# Patient Record
Sex: FEMALE | Race: WHITE | NOT HISPANIC OR LATINO | Employment: FULL TIME | URBAN - METROPOLITAN AREA
[De-identification: names, ages, dates, MRNs, and addresses within clinical notes are randomized per-mention and may not be internally consistent; named-entity substitution may affect disease eponyms.]

---

## 2017-09-22 ENCOUNTER — OFFICE VISIT (OUTPATIENT)
Dept: NEUROLOGY | Facility: CLINIC | Age: 22
End: 2017-09-22

## 2017-09-22 VITALS
HEIGHT: 65 IN | SYSTOLIC BLOOD PRESSURE: 106 MMHG | DIASTOLIC BLOOD PRESSURE: 75 MMHG | WEIGHT: 125 LBS | BODY MASS INDEX: 20.83 KG/M2

## 2017-09-22 DIAGNOSIS — G43.719 INTRACTABLE CHRONIC MIGRAINE WITHOUT AURA AND WITHOUT STATUS MIGRAINOSUS: ICD-10-CM

## 2017-09-22 DIAGNOSIS — R51.9 CHRONIC DAILY HEADACHE: Primary | ICD-10-CM

## 2017-09-22 PROCEDURE — 99204 OFFICE O/P NEW MOD 45 MIN: CPT | Performed by: PSYCHIATRY & NEUROLOGY

## 2017-09-22 RX ORDER — FLUOXETINE HYDROCHLORIDE 40 MG/1
40 CAPSULE ORAL DAILY
COMMUNITY
End: 2021-12-20

## 2017-09-22 NOTE — PROGRESS NOTES
"Subjective   Brittanie Bentley is a 21 y.o. female.     History of Present Illness     The following portions of the patient's history were reviewed today and updated as appropriate:  allergies, current medications, past family history, past medical history, past social history, past surgical history and problem list.      Chief complaint is headache  The time I spent with the patient today was used discussing the differential diagnosis, treatment and management options and prognosis. I addressed all of the patient's questions.   has childhood onset migraine type headaches and she has about one every couple weeks to 1 week. They are disabling is aggravated by activity with some nausea no vomiting and she is on occasion incapacitated. However she keeps the daily headache and several month ago started Topamax. The past she took it and lost a significant amount of weight and had to stop the medication but now she keeps her weight steady. She also has tingling paresthesias of side effects.    Review of Systems   Constitutional: Negative for appetite change, chills and fatigue.   HENT: Negative for congestion, ear pain, facial swelling and sinus pressure.    Eyes: Negative for pain and redness.   Respiratory: Negative for shortness of breath.    Cardiovascular: Negative for chest pain.   Gastrointestinal: Negative for abdominal pain.   Endocrine: Negative for cold intolerance and heat intolerance.   Genitourinary: Negative for dysuria.   Musculoskeletal: Negative for arthralgias.   Skin: Negative for rash.   Allergic/Immunologic: Negative for immunocompromised state.   Neurological: Positive for headaches.   Hematological: Negative for adenopathy.   Psychiatric/Behavioral: Negative for hallucinations.         Objective      height is 65\" (165.1 cm) and weight is 125 lb (56.7 kg). Her blood pressure is 106/75.     The patient's general appearance was normal today  Carotid pulses were palpable bilaterally  The " ophthalmological exam showed the refractory media clear, no blurring of disc margins, there were no hemorrhages noted, blood vessels appeared normal.      Neurologic Exam     Mental Status   Oriented to person.   Oriented to place. Oriented to country, city, area and street.   Oriented to time. Oriented to year, month, date, day and season.   Registration: recalls 3 of 3 objects. Recall at 5 minutes: recalls 3 of 3 objects. Follows 3 step commands.   Attention: normal.   Speech: speech is normal   Level of consciousness: alert  Knowledge: consistent with education.   Able to name object. Able to read. Able to repeat. Able to write. Normal comprehension.     Cranial Nerves     CN II   Visual fields full to confrontation.     CN III, IV, VI   Right pupil: Shape: regular. Consensual response: intact. Accommodation: intact.   Left pupil: Shape: regular. Consensual response: intact.   CN III: no CN III palsy  CN VI: no CN VI palsy  Nystagmus: none   Diplopia: none  Ophthalmoparesis: none  Upgaze: normal  Downgaze: normal  Conjugate gaze: present  Vestibulo-ocular reflex: present    CN V   Facial sensation intact.     CN VII   Facial expression full, symmetric.     CN VIII   CN VIII normal.     CN IX, X   CN IX normal.     CN XI   CN XI normal.     CN XII   CN XII normal.     Motor Exam   Muscle bulk: normal  Overall muscle tone: normal  Right arm pronator drift: absent  Left arm pronator drift: absent    Strength   Strength 5/5 except as noted.     Sensory Exam   Light touch normal.     Gait, Coordination, and Reflexes     Gait  Gait: normal    Coordination   Romberg: negative  Finger to nose coordination: normal  Heel to shin coordination: normal  Tandem walking coordination: normal    Tremor   Resting tremor: absent  Intention tremor: absent  Action tremor: absent    Reflexes   Reflexes 2+ except as noted.       Assessment/Plan     Brittanie was seen today for migraine.    Diagnoses and all orders for this  "visit:    Chronic daily headache    Intractable chronic migraine without aura and without status migrainosus          Discussion/Summary:    The patient is still to eat bananas or drink orange juice to counter the potassium loss but Topamax that is causing the tingling paresthesias purchased increase the dose every week by 25 mg unless she has no headaches during the week up to a total dose of 100 mg daily at bedtime.  If she is losing too much weight she is to contact the office that can switch her to zonegran                Disclaimer: This letter was created using dragon voice recognition software.  This voice recognition software may create errors that may go undetected and can impact the meaning of a sentence or paragraph.  The most frequent error is that the software misidentifies \"he\" and \"she\". However, contextual reading is often able to identify this as a voice recognotion error.  Another frequent error is that the software misidentifies \"the patient\" as \"\"          "

## 2020-10-02 ENCOUNTER — TELEPHONE (OUTPATIENT)
Dept: OBSTETRICS AND GYNECOLOGY | Facility: CLINIC | Age: 25
End: 2020-10-02

## 2020-10-02 NOTE — TELEPHONE ENCOUNTER
Reviewed w/ pt it can be normal to have irregular bleeding w/ Nexplanon but hopefully will improve or resolve with time.  If bleeding is excessive, having to change pad qh or less pt to call back.  P/v/u

## 2020-12-29 ENCOUNTER — TELEPHONE (OUTPATIENT)
Dept: OBSTETRICS AND GYNECOLOGY | Facility: CLINIC | Age: 25
End: 2020-12-29

## 2020-12-29 NOTE — TELEPHONE ENCOUNTER
Patient lvm that she would like to request a script and that she needs to speak to a nurse about birth control issues.

## 2021-01-04 ENCOUNTER — TELEPHONE (OUTPATIENT)
Dept: OBSTETRICS AND GYNECOLOGY | Facility: CLINIC | Age: 26
End: 2021-01-04

## 2021-01-04 NOTE — TELEPHONE ENCOUNTER
Pt stated she has been bleeding since June, after her nexplanon insertion. She states its very heavy and constant. She has been breaking out all over her body.

## 2021-01-04 NOTE — TELEPHONE ENCOUNTER
She has a history of migraines with auro.  She has tried mirena but got ovarian cysts, tried ParaGard and had major bleeding.    On all these methods she has had very few migraines.    Wondering if she could try a hormonal method again.    She had a neurologist but has not seen him in a few years and has not taken any meds from him for migraine.  Just takes excedrin migraine.

## 2021-01-04 NOTE — TELEPHONE ENCOUNTER
Per Dr Bergeron.  He thinks she should remove the IUD and try Micronor.  They can discuss a low dose pill after he sees how she dose on micronor

## 2021-01-06 ENCOUNTER — OFFICE VISIT (OUTPATIENT)
Dept: OBSTETRICS AND GYNECOLOGY | Facility: CLINIC | Age: 26
End: 2021-01-06

## 2021-01-06 VITALS
WEIGHT: 114 LBS | BODY MASS INDEX: 18.99 KG/M2 | HEIGHT: 65 IN | SYSTOLIC BLOOD PRESSURE: 104 MMHG | DIASTOLIC BLOOD PRESSURE: 68 MMHG

## 2021-01-06 DIAGNOSIS — N92.1 BREAKTHROUGH BLEEDING ON NEXPLANON: Primary | ICD-10-CM

## 2021-01-06 DIAGNOSIS — Z97.5 BREAKTHROUGH BLEEDING ON NEXPLANON: Primary | ICD-10-CM

## 2021-01-06 DIAGNOSIS — Z30.46 ENCOUNTER FOR SURVEILLANCE OF IMPLANTABLE SUBDERMAL CONTRACEPTIVE: ICD-10-CM

## 2021-01-06 PROCEDURE — 99212 OFFICE O/P EST SF 10 MIN: CPT | Performed by: OBSTETRICS & GYNECOLOGY

## 2021-01-06 RX ORDER — HYDROXYZINE HYDROCHLORIDE 10 MG/1
TABLET, FILM COATED ORAL
COMMUNITY
Start: 2020-12-07 | End: 2021-12-20

## 2021-01-06 RX ORDER — LAMOTRIGINE 150 MG/1
TABLET ORAL
COMMUNITY
Start: 2020-10-22 | End: 2021-12-20

## 2021-01-06 RX ORDER — ARIPIPRAZOLE 2 MG/1
2 TABLET ORAL DAILY
COMMUNITY
End: 2021-12-20

## 2021-01-06 NOTE — PROGRESS NOTES
Procedure: Nexplanon removal    Procedures     She has a history of migraines with auro. She has tried mirena but got ovarian cysts, tried para guard and had heavy bleeding. She has not seen neurologist in years and his only taking Excedrin Migraine only. She wants to conceive in a few years. Consent signed.  She had intercourse last night. Her pap is due but does not want pap today.    Pre Dx: 1) Nexplanon removal  Post Dx: 1) Nexplanon removal   The risks, benefits, and alternatives to removal and reinsertion of the device have been discussed with the patient at length. All of her questions are answered. She is aware of the need for alternative means of contraception if desired. Verbal informed consent is obtained.    After review of options we decided to leave the Nexplanon in.  She will try low Loestrin birth control pills for 2 packs.  Sometimes will break the pattern of breakthrough bleeding.  This may so also improve her acne.  We will reevaluate in 8 weeks.  We will decide about whether to take out the Nexplanon at that time  Grant Bergeron MD  01/06/2021

## 2021-03-10 ENCOUNTER — OFFICE VISIT (OUTPATIENT)
Dept: OBSTETRICS AND GYNECOLOGY | Facility: CLINIC | Age: 26
End: 2021-03-10

## 2021-03-10 VITALS
BODY MASS INDEX: 18.99 KG/M2 | SYSTOLIC BLOOD PRESSURE: 110 MMHG | DIASTOLIC BLOOD PRESSURE: 66 MMHG | WEIGHT: 114 LBS | HEIGHT: 65 IN

## 2021-03-10 DIAGNOSIS — Z97.5 BREAKTHROUGH BLEEDING ON NEXPLANON: ICD-10-CM

## 2021-03-10 DIAGNOSIS — R30.0 DYSURIA: Primary | ICD-10-CM

## 2021-03-10 DIAGNOSIS — N92.1 BREAKTHROUGH BLEEDING ON NEXPLANON: ICD-10-CM

## 2021-03-10 DIAGNOSIS — Z30.46 ENCOUNTER FOR SURVEILLANCE OF IMPLANTABLE SUBDERMAL CONTRACEPTIVE: ICD-10-CM

## 2021-03-10 LAB
BILIRUB BLD-MCNC: NEGATIVE MG/DL
GLUCOSE UR STRIP-MCNC: NEGATIVE MG/DL
KETONES UR QL: ABNORMAL
LEUKOCYTE EST, POC: ABNORMAL
NITRITE UR-MCNC: POSITIVE MG/ML
PH UR: 6.5 [PH] (ref 5–8)
PROT UR STRIP-MCNC: ABNORMAL MG/DL
RBC # UR STRIP: ABNORMAL /UL
SP GR UR: 1.03 (ref 1–1.03)
UROBILINOGEN UR QL: NORMAL

## 2021-03-10 PROCEDURE — 81002 URINALYSIS NONAUTO W/O SCOPE: CPT | Performed by: OBSTETRICS & GYNECOLOGY

## 2021-03-10 PROCEDURE — 99212 OFFICE O/P EST SF 10 MIN: CPT | Performed by: OBSTETRICS & GYNECOLOGY

## 2021-03-10 RX ORDER — ETONOGESTREL AND ETHINYL ESTRADIOL 11.7; 2.7 MG/1; MG/1
1 INSERT, EXTENDED RELEASE VAGINAL
Qty: 1 EACH | Refills: 11 | Status: SHIPPED | OUTPATIENT
Start: 2021-03-10 | End: 2021-04-21 | Stop reason: SDUPTHER

## 2021-03-15 ENCOUNTER — TELEPHONE (OUTPATIENT)
Dept: OBSTETRICS AND GYNECOLOGY | Facility: CLINIC | Age: 26
End: 2021-03-15

## 2021-03-15 RX ORDER — NITROFURANTOIN 25; 75 MG/1; MG/1
100 CAPSULE ORAL 2 TIMES DAILY
Qty: 10 CAPSULE | Refills: 0 | Status: SHIPPED | OUTPATIENT
Start: 2021-03-15 | End: 2021-03-20

## 2021-03-15 NOTE — TELEPHONE ENCOUNTER
PT WAS SEEN LAST WEEK AND DIDN'T GET THE URINE TEST BACK FOR POSSIBLE UTI. CCUA was positive, she was not given an antibiotic at the time of her appt. C/O dysuria, denies fever. No culture was sent. Will treat for UTI with macrobid.

## 2021-04-21 ENCOUNTER — OFFICE VISIT (OUTPATIENT)
Dept: OBSTETRICS AND GYNECOLOGY | Facility: CLINIC | Age: 26
End: 2021-04-21

## 2021-04-21 VITALS
DIASTOLIC BLOOD PRESSURE: 60 MMHG | WEIGHT: 118 LBS | BODY MASS INDEX: 19.66 KG/M2 | TEMPERATURE: 97.8 F | SYSTOLIC BLOOD PRESSURE: 100 MMHG | HEIGHT: 65 IN

## 2021-04-21 DIAGNOSIS — N92.1 BREAKTHROUGH BLEEDING ON NEXPLANON: Primary | ICD-10-CM

## 2021-04-21 DIAGNOSIS — Z30.46 ENCOUNTER FOR SURVEILLANCE OF IMPLANTABLE SUBDERMAL CONTRACEPTIVE: ICD-10-CM

## 2021-04-21 DIAGNOSIS — Z97.5 BREAKTHROUGH BLEEDING ON NEXPLANON: Primary | ICD-10-CM

## 2021-04-21 DIAGNOSIS — Z30.46 ENCOUNTER FOR NEXPLANON REMOVAL: ICD-10-CM

## 2021-04-21 PROCEDURE — 11982 REMOVE DRUG IMPLANT DEVICE: CPT | Performed by: OBSTETRICS & GYNECOLOGY

## 2021-04-21 RX ORDER — ETONOGESTREL AND ETHINYL ESTRADIOL 11.7; 2.7 MG/1; MG/1
1 INSERT, EXTENDED RELEASE VAGINAL
Qty: 1 EACH | Refills: 11 | Status: SHIPPED | OUTPATIENT
Start: 2021-04-21 | End: 2021-12-20

## 2021-04-21 RX ORDER — METHYLPHENIDATE HYDROCHLORIDE 54 MG/1
TABLET ORAL
COMMUNITY
Start: 2021-02-22 | End: 2021-12-20

## 2021-04-21 RX ORDER — PROPRANOLOL HYDROCHLORIDE 10 MG/1
TABLET ORAL
COMMUNITY
Start: 2021-02-22 | End: 2021-12-20

## 2021-04-21 NOTE — PROGRESS NOTES
Procedure: Nexplanon removal    Procedures    Pre Dx: 1) Nexplanon removal  Post Dx: 1) Nexplanon removal   The risks, benefits, and alternatives to removal and reinsertion of the device have been discussed with the patient at length. All of her questions are answered. She is aware of the need for alternative means of contraception if desired. Verbal informed consent is obtained.    Able to easily palpate the device in the  Left arm. Additional imaging was not required. The device feels freely mobile and easily accessible. She was placed in the examining table in a supine position with her arm flexed at the elbow and hand under her head. The skin over this site is prepped with Betadine. 2-3 mL of 1% lidocaine with epinephrine was injected.    Downward pressure was applied on the proximal end of the implant nearest the axilla, and a 2-3 mm incision was made in a longitudinal direction of the arm at the tip of the implant closest to the elbow. The implant was then pushed gently toward the incision until the tip was visible. The fibrous capsule was opened with blunt dissection using a hemostat. The implant was grasp with a hemostat and was easily removed intact. It measured a  full 4 cm in length.    Steristrip was placed over incision site as well as a pressure dressing.   The patient is having the device removed due to AUB since insertion 7 months ago. She is currently using Nuva Ring and would like to continue with it.    Tolerated well  No apparent complications  She was advised to call or return for complications such as fever, signs of infection, increased pain, or any other concerns.    Warning signs, limitations and expectations reviewed.   A/encounter for Nexplanon removal  P/removed without complication, patient tolerated the procedure well, is good to continue NuvaRing for birth control  Grant Bergeron MD  04/21/2021

## 2021-06-02 RX ORDER — RIZATRIPTAN BENZOATE 10 MG/1
10 TABLET ORAL ONCE AS NEEDED
Qty: 12 TABLET | Refills: 6 | Status: SHIPPED | OUTPATIENT
Start: 2021-06-02 | End: 2021-12-20

## 2021-06-02 NOTE — TELEPHONE ENCOUNTER
"Patient states she has no issues with the NuvaRing, but migraines have returned. Was originally taken off estrogen because migraines with aura. Would like to restart aimovig injections for migraines, if possible. Uncertain if insurance will cover. Stopped taking Maxalt but it was effective when she was taking it; side effects with other preventative migraine medications.    Patient requesting Dr. Bergeron prescribe Maxalt for her, as patient has not been to PCP in \"years\". She would also like to stay on the NuvaRing.    Patient reviewed her prescription history and states she has historically taken 10mg of Maxalt.    MD states he is ok with providing prescription for her. Pharmacy verified as [facility].  "

## 2021-06-02 NOTE — TELEPHONE ENCOUNTER
Patient left a message stating that she is wanting to speak with a nurse about her birth control. She has been having a lot of frequent migraines and is requesting to be put back on aimovig.

## 2021-08-11 ENCOUNTER — TELEPHONE (OUTPATIENT)
Dept: OBSTETRICS AND GYNECOLOGY | Facility: CLINIC | Age: 26
End: 2021-08-11

## 2021-08-11 DIAGNOSIS — N93.9 ABNORMAL UTERINE BLEEDING (AUB): Primary | ICD-10-CM

## 2021-08-11 NOTE — TELEPHONE ENCOUNTER
Patient recently stopped using nuva ring, she is now having bleeding with clots and is wondering why she keeps having this issue, she is wondering if there is anything we can do.

## 2021-08-11 NOTE — TELEPHONE ENCOUNTER
Patient reports she ended her menstrual on 8/3, and yesterday she started with heavy bleeding. States she only changed pads 4x in one day.    Progesterone only BCP did not work well for her.    Has also historically tried Nexplanon, Mirena, and Paragard with no improvement of bleeding.    Stopped NuvaRing due to return of migraines last month. Last U/S 2/2020 in [place].    Appointment 8/18 with THELMA Horton; 10:30 U/S.

## 2021-08-18 ENCOUNTER — OFFICE VISIT (OUTPATIENT)
Dept: OBSTETRICS AND GYNECOLOGY | Facility: CLINIC | Age: 26
End: 2021-08-18

## 2021-08-18 VITALS — SYSTOLIC BLOOD PRESSURE: 120 MMHG | BODY MASS INDEX: 19.84 KG/M2 | WEIGHT: 119.2 LBS | DIASTOLIC BLOOD PRESSURE: 70 MMHG

## 2021-08-18 DIAGNOSIS — N92.1 MENORRHAGIA WITH IRREGULAR CYCLE: ICD-10-CM

## 2021-08-18 DIAGNOSIS — N94.6 DYSMENORRHEA: ICD-10-CM

## 2021-08-18 DIAGNOSIS — R10.2 PELVIC PAIN: ICD-10-CM

## 2021-08-18 DIAGNOSIS — N94.10 FEMALE DYSPAREUNIA: ICD-10-CM

## 2021-08-18 DIAGNOSIS — N93.9 ABNORMAL UTERINE BLEEDING (AUB): ICD-10-CM

## 2021-08-18 DIAGNOSIS — Z01.419 WOMEN'S ANNUAL ROUTINE GYNECOLOGICAL EXAMINATION: Primary | ICD-10-CM

## 2021-08-18 PROCEDURE — 99395 PREV VISIT EST AGE 18-39: CPT | Performed by: NURSE PRACTITIONER

## 2021-08-18 NOTE — PROGRESS NOTES
GYN Annual Exam     CC - Here for annual exam.        HPI  Brittanie Bentley is a 25 y.o. female, , who presents for annual well woman exam. Patient's last menstrual period was 08/10/2021..  Periods are irregular, occurring every 2-4 weeks, lasting 5 days. Dysmenorrhea:severe, occurring throughout cycle.   There were no changes to her medical or surgical history since her last visit.. Partner Status: Marital Status:  and single.  She is sexually active. She has not had new partners since her last STD testing. She does not desire STD testing. . She has not had her HPV vaccines.     Pt reports issues with AUB, menorrhagia and dysmenorrhea. Pt states the last thing she was on was the Nuvaring, and she was having AUB every month. Prior to that she was on the Nexplanon with OCP's, Mirena, and she had a paragard. She states her periods are extremely heavy with a lot of clotting. Patient c/o of pelvic pain and back pain that comes and goes all the time. Patient also stated she has had pain with intercourse for a while now. Pt states the pain with IC is vaginal and deep within her pelvis. She states that at this point she can barely have IC due to the discomfort.     Pt can not tolerate estrogen containing products due to migraines.     Additional OB/GYN History   Current contraception: contraceptive methods: None  Desires to: do not start contraception  Last Pap :   Last Completed Pap Smear          Ordered - PAP SMEAR (Every 3 Years) Ordered on 2020  Done - in [place]              History of abnormal Pap smear: yes - HPV +  Family history of uterine, colon, breast, or ovarian cancer: yes - ovarian   Performs monthly Self-Breast Exam: no  Exercises Regularly:no  Feelings of Anxiety or Depression: yes - bipolar   Tobacco Usage?: No   OB History        2    Para   1    Term   1       0    AB   1    Living   1       SAB   1    TAB   0    Ectopic   0    Molar        Multiple   0     Live Births   1                Health Maintenance   Topic Date Due   • Annual Gynecologic Pelvic and Breast Exam  Never done   • ANNUAL PHYSICAL  Never done   • HPV VACCINES (1 - 2-dose series) Never done   • HEPATITIS C SCREENING  Never done   • INFLUENZA VACCINE  10/01/2021   • PAP SMEAR  01/06/2023   • TDAP/TD VACCINES (3 - Td or Tdap) 03/29/2029   • COVID-19 Vaccine  Completed   • Pneumococcal Vaccine 0-64  Aged Out       The additional following portions of the patient's history were reviewed and updated as appropriate: allergies, current medications, past family history, past medical history, past social history, past surgical history and problem list.    Review of Systems   Constitutional: Negative.    HENT: Negative.    Eyes: Negative.    Respiratory: Negative.    Cardiovascular: Negative.    Gastrointestinal: Negative.    Endocrine: Negative.    Genitourinary: Positive for dyspareunia, menstrual problem and pelvic pain.        Menorrhagia, dysmenorrhea    Musculoskeletal: Negative.    Skin: Negative.    Allergic/Immunologic: Negative.    Neurological: Negative.    Hematological: Negative.    Psychiatric/Behavioral: Negative.          I have reviewed and agree with the HPI, ROS, and historical information as entered above. Ambreen Gautam, THELMA    Objective   /70   Wt 54.1 kg (119 lb 3.2 oz)   LMP 08/10/2021   Breastfeeding No   BMI 19.84 kg/m²     Physical Exam  Vitals and nursing note reviewed. Exam conducted with a chaperone present.   Constitutional:       Appearance: She is well-developed.   HENT:      Head: Normocephalic and atraumatic.   Neck:      Thyroid: No thyroid mass or thyromegaly.   Cardiovascular:      Rate and Rhythm: Normal rate and regular rhythm.      Heart sounds: No murmur heard.     Pulmonary:      Effort: Pulmonary effort is normal. No retractions.      Breath sounds: Normal breath sounds. No wheezing, rhonchi or rales.   Chest:      Chest wall: No mass or tenderness.       Breasts:         Right: Normal. No mass, nipple discharge, skin change or tenderness.         Left: Normal. No mass, nipple discharge, skin change or tenderness.   Abdominal:      General: Bowel sounds are normal.      Palpations: Abdomen is soft. Abdomen is not rigid. There is no mass.      Tenderness: There is no abdominal tenderness. There is no guarding.      Hernia: No hernia is present. There is no hernia in the left inguinal area.   Genitourinary:     Labia:         Right: No rash, tenderness or lesion.         Left: No rash, tenderness or lesion.       Vagina: Normal. No vaginal discharge or lesions.      Cervix: No cervical motion tenderness, discharge, lesion or cervical bleeding.      Uterus: Normal. Tender. Not enlarged and not fixed.       Adnexa:         Right: Tenderness present. No mass.          Left: Tenderness present. No mass.        Rectum: No external hemorrhoid.   Musculoskeletal:      Cervical back: Normal range of motion. No muscular tenderness.   Neurological:      Mental Status: She is alert and oriented to person, place, and time.   Psychiatric:         Behavior: Behavior normal.            Assessment and Plan    Problem List Items Addressed This Visit     None      Visit Diagnoses     Women's annual routine gynecological examination    -  Primary    Relevant Orders    Pap IG, Ct-Ng, Rfx HPV ASCU    Abnormal uterine bleeding (AUB)        Pelvic pain        Dysmenorrhea        Menorrhagia with irregular cycle        Female dyspareunia              1. GYN annual well woman exam.   2. Reviewed monthly self breast exams.  Instructed to call with lumps, pain, or breast discharge.    3. Reviewed exercise as a preventative health measures.    4.   Pt reports issues with AUB, menorrhagia and dysmenorrhea. Pt states the last thing she was on was the Nuvaring x 6 months, and she was having AUB every month. Prior to that she was on the Nexplanon with OCP's, Mirena, and she had a paragard. She states  her periods are extremely heavy with a lot of clotting. Patient c/o of pelvic pain and back pain that comes and goes all the time. Patient also stated she has had pain with intercourse for a while now. Pt states the pain with IC is vaginal and deep within her pelvis. She states that at this point she can barely have IC due to the discomfort. Pt had U/S today that was unremarkable. U/S tech reported that the pt tolerated the scan poorly due to discomfort. Pt does not desire to restart BC at this time, wants to give her body a break. I recommend the pt RTC to see Dr Bergeron to discuss possible need for a Dx Lap.       Ambreen Gautam, APRN  08/18/2021

## 2021-08-24 DIAGNOSIS — Z01.419 WOMEN'S ANNUAL ROUTINE GYNECOLOGICAL EXAMINATION: ICD-10-CM

## 2021-08-25 ENCOUNTER — OFFICE VISIT (OUTPATIENT)
Dept: OBSTETRICS AND GYNECOLOGY | Facility: CLINIC | Age: 26
End: 2021-08-25

## 2021-08-25 VITALS
BODY MASS INDEX: 20.14 KG/M2 | HEIGHT: 64 IN | DIASTOLIC BLOOD PRESSURE: 72 MMHG | WEIGHT: 118 LBS | SYSTOLIC BLOOD PRESSURE: 115 MMHG

## 2021-08-25 DIAGNOSIS — R10.2 PELVIC PAIN: Primary | ICD-10-CM

## 2021-08-25 PROCEDURE — 99213 OFFICE O/P EST LOW 20 MIN: CPT | Performed by: OBSTETRICS & GYNECOLOGY

## 2021-08-25 RX ORDER — NAPROXEN SODIUM 550 MG/1
550 TABLET ORAL 2 TIMES DAILY PRN
Qty: 40 TABLET | Refills: 5 | Status: SHIPPED | OUTPATIENT
Start: 2021-08-25 | End: 2021-12-20

## 2021-08-25 NOTE — PROGRESS NOTES
Chief Complaint   Patient presents with   • Follow-up         Subjective   HPI  Brittanie Bentley is a 25 y.o. female, , who presents with evaluation of pelvic pain.      She states she has chronic pelvic pain.  She states she has experienced this problem for several months.  She describes the severity as moderate.  She rates her pain score as a 8/10.  She states that the problem is waxing and waning.  She states the pain is located in the lower abdominal area and back and it does not radiate. The patient reports additional symptoms as none.  The patient has previously been evaluated for pelvic pain.    Her last LMP was Patient's last menstrual period was 08/10/2021..  Periods are irregular, lasting several days.  Dysmenorrhea:moderate, occurring throughout cycle.  Partner Status: Marital Status: single.  New Partners since last visit: no.  Desires STD Screening: no.    Patient is her to follow up on pelvic pain. Patient has tried multiple birth controls and still has not seen improvements.       Problems with GI: no  History of urinary disease: no  Concern for Anxiety or Depression: no  Exercises Regularly: yes  Tobacco Usage?: No     Additional OB/GYN History   Last Pap :   Last Completed Pap Smear          Ordered - PAP SMEAR (Every 3 Years) Ordered on 2021  SCANNED - PAP SMEAR    2020  Done - in [place]              History of abnormal Pap smear: yes - Up-to-date  OB History        2    Para   1    Term   1       0    AB   1    Living   1       SAB   1    TAB   0    Ectopic   0    Molar        Multiple   0    Live Births   1                The additional following portions of the patient's history were reviewed and updated as appropriate: allergies, current medications, past family history, past medical history, past social history, past surgical history and problem list.    Did the patient have u/s today? No.    All other systems reviewed and are negative.   Patient  "complains of intermittent pelvic and back pain occasionally severe 8 out of 10.  She also complains of heavy irregular periods.  Her pain is not necessarily associated with her periods    I have reviewed and agree with the HPI, ROS, and historical information as entered above. Grant Bergeron MD    Objective   /72   Ht 162.6 cm (64\")   Wt 53.5 kg (118 lb)   LMP 08/10/2021   BMI 20.25 kg/m²     Physical Exam  Patient awake alert oriented today  Assessment/Plan     Assessment and Plan    Problem List Items Addressed This Visit        Gastrointestinal Abdominal     Pelvic pain - Primary    Overview     Pelvic pain and back pain for several years.  Patient has been on several different birth control pills and NuvaRing.  She has been on a Nexplanon  She has tried a Mirena IUD  Despite the above trials she still has significant pelvic pain           Ultrasound last visit on 8/18 within normal limits  She had her gallbladder out for recurrent GI issues at age 16  1. I am going to have her see Sam Garcia MD at  for second opinion  Our next step would be to consider diagnostic laparoscopy    Grant Bergeron MD  08/25/2021    "

## 2021-12-06 ENCOUNTER — TELEPHONE (OUTPATIENT)
Dept: OBSTETRICS AND GYNECOLOGY | Facility: CLINIC | Age: 26
End: 2021-12-06

## 2021-12-06 ENCOUNTER — LAB (OUTPATIENT)
Dept: OBSTETRICS AND GYNECOLOGY | Facility: CLINIC | Age: 26
End: 2021-12-06

## 2021-12-06 DIAGNOSIS — O20.0 THREATENED ABORTION: Primary | ICD-10-CM

## 2021-12-06 NOTE — TELEPHONE ENCOUNTER
Come for hcg, prog today, take PNV with DHA and Colace. Plan to repeat the hcg in 48-72 hours and if her pain becomes severe mayne US to look for a GS. RWO/RW. Blood type A+

## 2021-12-06 NOTE — TELEPHONE ENCOUNTER
She had constipation but had been more regular this morning. Over the weekend she was sharp abdominal pains, denies bleeding, fever. LMP 2021  Approx 4 weeks 2/7 days. She was scheduled to have dx lap with Dr. Garcia in [place] for [facility] and is pregnant.

## 2021-12-06 NOTE — TELEPHONE ENCOUNTER
Patient called and reported that she has been having uteran pains over this weekend, worsened over night last night/this morning. Sporadically coming, no bleeding reported per patient. Would like to speak with nurse.

## 2021-12-07 ENCOUNTER — TELEPHONE (OUTPATIENT)
Dept: OBSTETRICS AND GYNECOLOGY | Facility: CLINIC | Age: 26
End: 2021-12-07

## 2021-12-07 DIAGNOSIS — O20.0 THREATENED ABORTION: Primary | ICD-10-CM

## 2021-12-07 LAB
HCG INTACT+B SERPL-ACNC: 815.4 MIU/ML
PROGEST SERPL-MCNC: 29.7 NG/ML

## 2021-12-07 NOTE — TELEPHONE ENCOUNTER
Notified patient of HCG and progesterone results. Plan to repeat hcg tomorrow. lmp 11/5/2021. C/o intermittent pain with position change yesterday-mid pelvis. Denies pain today.     She will call with severe pain/bleeding. She vu.

## 2021-12-08 ENCOUNTER — LAB (OUTPATIENT)
Dept: OBSTETRICS AND GYNECOLOGY | Facility: CLINIC | Age: 26
End: 2021-12-08

## 2021-12-09 ENCOUNTER — TELEPHONE (OUTPATIENT)
Dept: OBSTETRICS AND GYNECOLOGY | Facility: CLINIC | Age: 26
End: 2021-12-09

## 2021-12-09 LAB — HCG INTACT+B SERPL-ACNC: NORMAL MIU/ML

## 2021-12-20 ENCOUNTER — INITIAL PRENATAL (OUTPATIENT)
Dept: OBSTETRICS AND GYNECOLOGY | Facility: CLINIC | Age: 26
End: 2021-12-20

## 2021-12-20 VITALS — DIASTOLIC BLOOD PRESSURE: 56 MMHG | WEIGHT: 133.2 LBS | BODY MASS INDEX: 22.86 KG/M2 | SYSTOLIC BLOOD PRESSURE: 104 MMHG

## 2021-12-20 DIAGNOSIS — Z3A.01 LESS THAN 8 WEEKS GESTATION OF PREGNANCY: Primary | ICD-10-CM

## 2021-12-20 PROBLEM — Z30.46 ENCOUNTER FOR NEXPLANON REMOVAL: Status: RESOLVED | Noted: 2021-04-21 | Resolved: 2021-12-20

## 2021-12-20 PROBLEM — Z30.46 ENCOUNTER FOR SURVEILLANCE OF IMPLANTABLE SUBDERMAL CONTRACEPTIVE: Status: RESOLVED | Noted: 2021-01-06 | Resolved: 2021-12-20

## 2021-12-20 PROBLEM — Z97.5 BREAKTHROUGH BLEEDING ON NEXPLANON: Status: RESOLVED | Noted: 2021-01-06 | Resolved: 2021-12-20

## 2021-12-20 PROBLEM — N92.1 BREAKTHROUGH BLEEDING ON NEXPLANON: Status: RESOLVED | Noted: 2021-01-06 | Resolved: 2021-12-20

## 2021-12-20 PROCEDURE — 0501F PRENATAL FLOW SHEET: CPT | Performed by: OBSTETRICS & GYNECOLOGY

## 2021-12-20 NOTE — PROGRESS NOTES
Initial ob visit     CC- Here for care of pregnancy        Brittanie Bentley is a 26 y.o. female, , who presents for her first obstetrical visit.  Her last LMP was Patient's last menstrual period was 2021..    OB History    Para Term  AB Living   3 1 1 0 1 1   SAB IAB Ectopic Molar Multiple Live Births   1 0 0 0 0 1      # Outcome Date GA Lbr Porfirio/2nd Weight Sex Delivery Anes PTL Lv   3 Current            2 Term 10/11/16 39w1d 30:56 / 00:55 2995 g (6 lb 9.6 oz) F Vag-Spont EPI N JANNIE   1 SAB 2015               Initial positive test date: 2021, UPT          Prior obstetric issues, potential pregnancy concerns: SABx1, bipolar 2  Family history of genetic issues (includes FOB): maternal niece (aortic defect) / nephew (murmur)  Prior infections concerning in pregnancy (Rash, fever in last 2 weeks): no  Varicella Hx - uncertain if vaccine received  Prior testing for Cystic Fibrosis Carrier or Sickle Cell Trait- no  Prepregnancy BMI - Body mass index is 22.86 kg/m².  History of STD: no  Ultrasound Today: Yes.  Findings showed viable intrauterine pregnancy.  I have personally evaluated the U/S and agree with the findings. Grant Bergeron MD    Additional Pertinent History   Last Pap :   Last Completed Pap Smear          Ordered - PAP SMEAR (Every 3 Years) Ordered on 2021  SCANNED - PAP SMEAR    2020  Done - in [place]              History of abnormal Pap smear: yes -    Family history of uterine, colon, breast, or ovarian cancer: yes -  mother uterine  Feelings of Anxiety or Depression: no  Tobacco Usage?: No   Alcohol/Drug Use?: NO  Over the age of 35 at delivery: no  Desires Genetic Screening: Cell Free DNA  Flu Status: Will get at work/pharmacy/other facility     PMH  Past Medical History:   Diagnosis Date   • Bipolar 2 disorder (HCC) Spring 2020   • History of abnormal cervical Papanicolaou smear    • Migraine        Current Outpatient Medications:   •   Prenatal Vit-Fe Fumarate-FA (PRENATAL 27-) 27-1 MG tablet tablet, Take 1 tablet by mouth Daily., Disp: , Rfl:     The additional following portions of the patient's history were reviewed and updated as appropriate: allergies, current medications, past family history, past medical history, past social history, past surgical history and problem list.    Review of Systems   Review of Systems  Current obstetric complaints: nausea, vomiting (emesis x1 over the weekend), fatigue, breast tenderness, and cramping    All systems reviewed and otherwise normal.    I have reviewed and agree with the HPI, ROS, and historical information as entered above. Grant Bergeron MD    /56   Wt 60.4 kg (133 lb 3.2 oz)   LMP 2021   BMI 22.86 kg/m²     Physical Exam  General:  well developed; well nourished  no acute distress   Chest/Respiratory:    Heart:     Thyroid:    Breasts:     Abdomen:    Pelvis:         Assessment and Plan    Problem List Items Addressed This Visit        Gravid and     Less than 8 weeks gestation of pregnancy - Primary    Relevant Orders    Obstetric Panel    HIV-1 / O / 2 Ag / Antibody 4th Generation    Chlamydia trachomatis, Neisseria gonorrhoeae, PCR w/ confirmation - Urine, Urine, Clean Catch    Urine Drug Screen - Urine, Clean Catch    Urinalysis With Microscopic - Urine, Clean Catch    Urine Culture - Urine, Urine, Clean Catch          1. Pregnancy at 6w1d  2. Reviewed routine prenatal care with the office and educational materials given  Follow-up in 4 weeks      Grant Bergeron MD  2021

## 2021-12-20 NOTE — PROGRESS NOTES
Initial ob visit     CC- Here for care of pregnancy        Brittanie Bentley is a 26 y.o. female, , who presents for her first obstetrical visit.  Her last LMP was Patient's last menstrual period was 2021..    OB History    Para Term  AB Living   3 1 1 0 1 1   SAB IAB Ectopic Molar Multiple Live Births   1 0 0 0 0 1      # Outcome Date GA Lbr Porfirio/2nd Weight Sex Delivery Anes PTL Lv   3 Current            2 Term 10/11/16 39w1d 30:56 / 00:55 2995 g (6 lb 9.6 oz) F Vag-Spont EPI N JANNIE   1 SAB 2015               Initial positive test date : ***, {UPT/HCG/US:22981}          Prior obstetric issues, potential pregnancy concerns: ***  Family history of genetic issues (includes FOB): ***  Prior infections concerning in pregnancy (Rash, fever in last 2 weeks): ***  Varicella Hx -***  Prior testing for Cystic Fibrosis Carrier or Sickle Cell Trait- ***  Prepregnancy BMI - There is no height or weight on file to calculate BMI.  History of STD: {std yes/no:49136}  Ultrasound Today: {US Today?:83391}    Additional Pertinent History   Last Pap :   Last Completed Pap Smear          Ordered - PAP SMEAR (Every 3 Years) Ordered on 2021  SCANNED - PAP SMEAR    2020  Done - in [place]              History of abnormal Pap smear: {yes***/no:46122}  Family history of uterine, colon, breast, or ovarian cancer: {yes***/no:13521}  Feelings of Anxiety or Depression: {yes***/no:88390}  Tobacco Usage?: {Tobacco Usage Optional:56633}   Alcohol/Drug Use?: {yes no:74645}  Over the age of 35 at delivery: {YES:54830}  Desires Genetic Screening: {Genetic testin}  Flu Status: {Current Flu Status:93959}    PMH  Past Medical History:   Diagnosis Date   • Depression    • History of abnormal cervical Papanicolaou smear    • Migraine        Current Outpatient Medications:   •  ARIPiprazole (ABILIFY) 2 MG tablet, Take 2 mg by mouth Daily., Disp: , Rfl:   •  Etonogestrel (NEXPLANON SC), Inject  under  "the skin into the appropriate area as directed., Disp: , Rfl:   •  etonogestrel-ethinyl estradiol (NuvaRing) 0.12-0.015 MG/24HR vaginal ring, Insert 1 each into the vagina Every 28 (Twenty-Eight) Days. Insert vaginally and leave in place for 3 consecutive weeks, then remove for 1 week., Disp: 1 each, Rfl: 11  •  FLUoxetine (PROZAC) 40 MG capsule, Take 40 mg by mouth Daily., Disp: , Rfl:   •  hydrOXYzine (ATARAX) 10 MG tablet, , Disp: , Rfl:   •  ibuprofen (ADVIL,MOTRIN) 600 MG tablet, Take 1 tablet by mouth Every 8 (Eight) Hours As Needed for mild pain (1-3)., Disp: 30 tablet, Rfl: 0  •  lamoTRIgine (LaMICtal) 150 MG tablet, , Disp: , Rfl:   •  methylphenidate 54 MG CR tablet, , Disp: , Rfl:   •  naproxen sodium (Anaprox DS) 550 MG tablet, Take 1 tablet by mouth 2 (Two) Times a Day As Needed (Mild to moderate pain)., Disp: 40 tablet, Rfl: 5  •  Prenatal Vit-Fe Fumarate-FA (PRENATAL 27-1) 27-1 MG tablet tablet, Take 1 tablet by mouth Daily., Disp: , Rfl:   •  propranolol (INDERAL) 10 MG tablet, , Disp: , Rfl:   •  rizatriptan (Maxalt) 10 MG tablet, Take 1 tablet by mouth 1 (One) Time As Needed for Migraine for up to 1 dose. May repeat in 2 hours if needed, Disp: 12 tablet, Rfl: 6  •  SUMAtriptan Succinate (IMITREX PO), Take  by mouth As Needed., Disp: , Rfl:   •  Topiramate (TOPAMAX PO), Take  by mouth., Disp: , Rfl:     The additional following portions of the patient's history were reviewed and updated as appropriate: {history reviewed:20406::\"allergies\",\"current medications\",\"past family history\",\"past medical history\",\"past social history\",\"past surgical history\",\"problem list\"}.    Review of Systems   Review of Systems  Current obstetric complaints : {OBSymptoms:52101}   All systems reviewed and otherwise normal.    I have reviewed and agree with the HPI, ROS, and historical information as entered above. Leda Beasley RN    LMP 11/07/2021     Physical Exam  General:  {Exam - general findings:20217::\"well " "developed; well nourished\",\"no acute distress\"}   Chest/Respiratory: {Exam - Chest/Resp:42410::\"No labored breathing, normal respiratory effort, normal appearance, no respiratory noises noted\"}   Heart:  {Exam - Heart:37782::\"normal rate, regular rhythm,  no murmurs, rubs, or gallops\"}   Thyroid: {Exam - thyroid:49249::\"normal to inspection and palpation\"}   Breasts:  {ashmunbreastexam:79295::\"Not performed.\"}   Abdomen: {Exam - abdomen:78007::\"soft, non-tender; no masses\",\"no umbilical or inguinal hernias are present\",\"no hepato-splenomegaly\"}   Pelvis: {ashmunpelvicnob:20044::\"Not performed.\"}        Assessment and Plan    Problem List Items Addressed This Visit     None      Visit Diagnoses     Less than 8 weeks gestation of pregnancy    -  Primary    Relevant Orders    Obstetric Panel    HIV-1 / O / 2 Ag / Antibody 4th Generation    Chlamydia trachomatis, Neisseria gonorrhoeae, PCR w/ confirmation - Urine, Urine, Clean Catch    Urine Drug Screen - Urine, Clean Catch    Urinalysis With Microscopic - Urine, Clean Catch    Urine Culture - Urine, Urine, Clean Catch          1. Pregnancy at 6w1d  2. {Pregnancy Plan:08425}  3. No follow-ups on file.      Leda Beasley RN  12/20/2021  "

## 2021-12-21 ENCOUNTER — PATIENT ROUNDING (BHMG ONLY) (OUTPATIENT)
Dept: OBSTETRICS AND GYNECOLOGY | Facility: CLINIC | Age: 26
End: 2021-12-21

## 2021-12-25 LAB
ABO GROUP BLD: ABNORMAL
AMPHETAMINES UR QL SCN: NEGATIVE NG/ML
APPEARANCE UR: CLEAR
BACTERIA #/AREA URNS HPF: ABNORMAL /[HPF]
BACTERIA UR CULT: ABNORMAL
BACTERIA UR CULT: ABNORMAL
BARBITURATES UR QL SCN: NEGATIVE NG/ML
BASOPHILS # BLD AUTO: 0 X10E3/UL (ref 0–0.2)
BASOPHILS NFR BLD AUTO: 0 %
BENZODIAZ UR QL SCN: NEGATIVE NG/ML
BILIRUB UR QL STRIP: NEGATIVE
BLD GP AB SCN SERPL QL: NEGATIVE
BZE UR QL SCN: NEGATIVE NG/ML
C TRACH RRNA SPEC QL NAA+PROBE: NEGATIVE
CANNABINOIDS UR QL SCN: NEGATIVE NG/ML
CASTS URNS QL MICRO: ABNORMAL /LPF
COLOR UR: YELLOW
CREAT UR-MCNC: 144.6 MG/DL (ref 20–300)
EOSINOPHIL # BLD AUTO: 0.1 X10E3/UL (ref 0–0.4)
EOSINOPHIL NFR BLD AUTO: 1 %
EPI CELLS #/AREA URNS HPF: ABNORMAL /HPF (ref 0–10)
ERYTHROCYTE [DISTWIDTH] IN BLOOD BY AUTOMATED COUNT: 12.9 % (ref 11.7–15.4)
GLUCOSE UR QL: NEGATIVE
HBV SURFACE AG SERPL QL IA: NEGATIVE
HCT VFR BLD AUTO: 37.8 % (ref 34–46.6)
HCV AB S/CO SERPL IA: <0.1 S/CO RATIO (ref 0–0.9)
HGB BLD-MCNC: 12.6 G/DL (ref 11.1–15.9)
HGB UR QL STRIP: NEGATIVE
HIV 1+2 AB+HIV1 P24 AG SERPL QL IA: NON REACTIVE
IMM GRANULOCYTES # BLD AUTO: 0 X10E3/UL (ref 0–0.1)
IMM GRANULOCYTES NFR BLD AUTO: 0 %
KETONES UR QL STRIP: NEGATIVE
LABORATORY COMMENT REPORT: NORMAL
LEUKOCYTE ESTERASE UR QL STRIP: ABNORMAL
LYMPHOCYTES # BLD AUTO: 0.9 X10E3/UL (ref 0.7–3.1)
LYMPHOCYTES NFR BLD AUTO: 18 %
MCH RBC QN AUTO: 29.1 PG (ref 26.6–33)
MCHC RBC AUTO-ENTMCNC: 33.3 G/DL (ref 31.5–35.7)
MCV RBC AUTO: 87 FL (ref 79–97)
METHADONE UR QL SCN: NEGATIVE NG/ML
MICRO URNS: ABNORMAL
MONOCYTES # BLD AUTO: 0.8 X10E3/UL (ref 0.1–0.9)
MONOCYTES NFR BLD AUTO: 17 %
N GONORRHOEA RRNA SPEC QL NAA+PROBE: NEGATIVE
NEUTROPHILS # BLD AUTO: 3.1 X10E3/UL (ref 1.4–7)
NEUTROPHILS NFR BLD AUTO: 64 %
NITRITE UR QL STRIP: NEGATIVE
OPIATES UR QL SCN: NEGATIVE NG/ML
OTHER ANTIBIOTIC SUSC ISLT: ABNORMAL
OXYCODONE+OXYMORPHONE UR QL SCN: NEGATIVE NG/ML
PCP UR QL: NEGATIVE NG/ML
PH UR STRIP: 6.5 [PH] (ref 5–7.5)
PH UR: 6.9 [PH] (ref 4.5–8.9)
PLATELET # BLD AUTO: 167 X10E3/UL (ref 150–450)
PROPOXYPH UR QL SCN: NEGATIVE NG/ML
PROT UR QL STRIP: ABNORMAL
RBC # BLD AUTO: 4.33 X10E6/UL (ref 3.77–5.28)
RBC #/AREA URNS HPF: ABNORMAL /HPF (ref 0–2)
RH BLD: POSITIVE
RPR SER QL: NON REACTIVE
RUBV IGG SERPL IA-ACNC: <0.9 INDEX
SP GR UR: 1.03 (ref 1–1.03)
UROBILINOGEN UR STRIP-MCNC: 0.2 MG/DL (ref 0.2–1)
WBC # BLD AUTO: 5 X10E3/UL (ref 3.4–10.8)
WBC #/AREA URNS HPF: ABNORMAL /HPF (ref 0–5)

## 2021-12-27 DIAGNOSIS — Z3A.01 LESS THAN 8 WEEKS GESTATION OF PREGNANCY: Primary | ICD-10-CM

## 2021-12-27 RX ORDER — AMOXICILLIN AND CLAVULANATE POTASSIUM 875; 125 MG/1; MG/1
1 TABLET, FILM COATED ORAL EVERY 12 HOURS
Qty: 14 TABLET | Refills: 0 | OUTPATIENT
Start: 2021-12-27 | End: 2021-12-31

## 2021-12-29 PROCEDURE — U0004 COV-19 TEST NON-CDC HGH THRU: HCPCS | Performed by: NURSE PRACTITIONER

## 2021-12-31 ENCOUNTER — HOSPITAL ENCOUNTER (EMERGENCY)
Facility: HOSPITAL | Age: 26
Discharge: HOME | End: 2021-12-31
Attending: EMERGENCY MEDICINE | Admitting: EMERGENCY MEDICINE

## 2021-12-31 VITALS
TEMPERATURE: 97.6 F | OXYGEN SATURATION: 99 % | SYSTOLIC BLOOD PRESSURE: 93 MMHG | RESPIRATION RATE: 14 BRPM | BODY MASS INDEX: 23.74 KG/M2 | WEIGHT: 134 LBS | DIASTOLIC BLOOD PRESSURE: 56 MMHG | HEART RATE: 77 BPM | HEIGHT: 63 IN

## 2021-12-31 DIAGNOSIS — K29.00 ACUTE GASTRITIS WITHOUT HEMORRHAGE, UNSPECIFIED GASTRITIS TYPE: Primary | ICD-10-CM

## 2021-12-31 DIAGNOSIS — N39.0 BACTERIAL UTI: ICD-10-CM

## 2021-12-31 DIAGNOSIS — Z34.91 FIRST TRIMESTER PREGNANCY: ICD-10-CM

## 2021-12-31 DIAGNOSIS — J06.9 UPPER RESPIRATORY TRACT INFECTION, UNSPECIFIED TYPE: ICD-10-CM

## 2021-12-31 DIAGNOSIS — A49.9 BACTERIAL UTI: ICD-10-CM

## 2021-12-31 LAB
ALBUMIN SERPL-MCNC: 4.1 G/DL (ref 3.5–5.2)
ALBUMIN/GLOB SERPL: 1.5 G/DL
ALP SERPL-CCNC: 51 U/L (ref 39–117)
ALT SERPL W P-5'-P-CCNC: 11 U/L (ref 1–33)
ANION GAP SERPL CALCULATED.3IONS-SCNC: 12 MMOL/L (ref 5–15)
AST SERPL-CCNC: 14 U/L (ref 1–32)
B-HCG UR QL: POSITIVE
BACTERIA UR QL AUTO: ABNORMAL /HPF
BASOPHILS # BLD AUTO: 0.02 10*3/MM3 (ref 0–0.2)
BASOPHILS NFR BLD AUTO: 0.2 % (ref 0–1.5)
BILIRUB SERPL-MCNC: 0.5 MG/DL (ref 0–1.2)
BILIRUB UR QL STRIP: NEGATIVE
BUN SERPL-MCNC: 6 MG/DL (ref 6–20)
BUN/CREAT SERPL: 11.3 (ref 7–25)
CALCIUM SPEC-SCNC: 9.1 MG/DL (ref 8.6–10.5)
CHLORIDE SERPL-SCNC: 103 MMOL/L (ref 98–107)
CLARITY UR: ABNORMAL
CO2 SERPL-SCNC: 20 MMOL/L (ref 22–29)
COLOR UR: YELLOW
CREAT SERPL-MCNC: 0.53 MG/DL (ref 0.57–1)
DEPRECATED RDW RBC AUTO: 40.3 FL (ref 37–54)
EOSINOPHIL # BLD AUTO: 0.01 10*3/MM3 (ref 0–0.4)
EOSINOPHIL NFR BLD AUTO: 0.1 % (ref 0.3–6.2)
ERYTHROCYTE [DISTWIDTH] IN BLOOD BY AUTOMATED COUNT: 12.8 % (ref 12.3–15.4)
EXPIRATION DATE: ABNORMAL
FLUAV SUBTYP SPEC NAA+PROBE: NOT DETECTED
FLUBV RNA ISLT QL NAA+PROBE: NOT DETECTED
GFR SERPL CREATININE-BSD FRML MDRD: 139 ML/MIN/1.73
GLOBULIN UR ELPH-MCNC: 2.8 GM/DL
GLUCOSE SERPL-MCNC: 82 MG/DL (ref 65–99)
GLUCOSE UR STRIP-MCNC: NEGATIVE MG/DL
HCT VFR BLD AUTO: 38.8 % (ref 34–46.6)
HGB BLD-MCNC: 13.1 G/DL (ref 12–15.9)
HGB UR QL STRIP.AUTO: NEGATIVE
HOLD SPECIMEN: NORMAL
HYALINE CASTS UR QL AUTO: ABNORMAL /LPF
IMM GRANULOCYTES # BLD AUTO: 0.04 10*3/MM3 (ref 0–0.05)
IMM GRANULOCYTES NFR BLD AUTO: 0.4 % (ref 0–0.5)
INTERNAL NEGATIVE CONTROL: NEGATIVE
INTERNAL POSITIVE CONTROL: POSITIVE
KETONES UR QL STRIP: ABNORMAL
LEUKOCYTE ESTERASE UR QL STRIP.AUTO: ABNORMAL
LIPASE SERPL-CCNC: 20 U/L (ref 13–60)
LYMPHOCYTES # BLD AUTO: 1.53 10*3/MM3 (ref 0.7–3.1)
LYMPHOCYTES NFR BLD AUTO: 14.2 % (ref 19.6–45.3)
Lab: ABNORMAL
MCH RBC QN AUTO: 29.1 PG (ref 26.6–33)
MCHC RBC AUTO-ENTMCNC: 33.8 G/DL (ref 31.5–35.7)
MCV RBC AUTO: 86.2 FL (ref 79–97)
MONOCYTES # BLD AUTO: 0.85 10*3/MM3 (ref 0.1–0.9)
MONOCYTES NFR BLD AUTO: 7.9 % (ref 5–12)
NEUTROPHILS NFR BLD AUTO: 77.2 % (ref 42.7–76)
NEUTROPHILS NFR BLD AUTO: 8.34 10*3/MM3 (ref 1.7–7)
NITRITE UR QL STRIP: NEGATIVE
NRBC BLD AUTO-RTO: 0 /100 WBC (ref 0–0.2)
PH UR STRIP.AUTO: 6.5 [PH] (ref 5–8)
PLATELET # BLD AUTO: 204 10*3/MM3 (ref 140–450)
PMV BLD AUTO: 10.4 FL (ref 6–12)
POTASSIUM SERPL-SCNC: 3.6 MMOL/L (ref 3.5–5.2)
PROT SERPL-MCNC: 6.9 G/DL (ref 6–8.5)
PROT UR QL STRIP: ABNORMAL
RBC # BLD AUTO: 4.5 10*6/MM3 (ref 3.77–5.28)
RBC # UR STRIP: ABNORMAL /HPF
REF LAB TEST METHOD: ABNORMAL
SARS-COV-2 RNA PNL SPEC NAA+PROBE: NOT DETECTED
SODIUM SERPL-SCNC: 135 MMOL/L (ref 136–145)
SP GR UR STRIP: 1.02 (ref 1–1.03)
SQUAMOUS #/AREA URNS HPF: ABNORMAL /HPF
UROBILINOGEN UR QL STRIP: ABNORMAL
WBC # UR STRIP: ABNORMAL /HPF
WBC NRBC COR # BLD: 10.79 10*3/MM3 (ref 3.4–10.8)
WHOLE BLOOD HOLD SPECIMEN: NORMAL
WHOLE BLOOD HOLD SPECIMEN: NORMAL

## 2021-12-31 PROCEDURE — 87086 URINE CULTURE/COLONY COUNT: CPT | Performed by: EMERGENCY MEDICINE

## 2021-12-31 PROCEDURE — 80053 COMPREHEN METABOLIC PANEL: CPT | Performed by: EMERGENCY MEDICINE

## 2021-12-31 PROCEDURE — 87077 CULTURE AEROBIC IDENTIFY: CPT | Performed by: EMERGENCY MEDICINE

## 2021-12-31 PROCEDURE — 96361 HYDRATE IV INFUSION ADD-ON: CPT

## 2021-12-31 PROCEDURE — 87636 SARSCOV2 & INF A&B AMP PRB: CPT | Performed by: EMERGENCY MEDICINE

## 2021-12-31 PROCEDURE — 83690 ASSAY OF LIPASE: CPT | Performed by: EMERGENCY MEDICINE

## 2021-12-31 PROCEDURE — 99283 EMERGENCY DEPT VISIT LOW MDM: CPT

## 2021-12-31 PROCEDURE — 25010000002 CEFTRIAXONE PER 250 MG: Performed by: EMERGENCY MEDICINE

## 2021-12-31 PROCEDURE — 81025 URINE PREGNANCY TEST: CPT | Performed by: EMERGENCY MEDICINE

## 2021-12-31 PROCEDURE — 85025 COMPLETE CBC W/AUTO DIFF WBC: CPT | Performed by: EMERGENCY MEDICINE

## 2021-12-31 PROCEDURE — 81001 URINALYSIS AUTO W/SCOPE: CPT | Performed by: EMERGENCY MEDICINE

## 2021-12-31 PROCEDURE — 87186 SC STD MICRODIL/AGAR DIL: CPT | Performed by: EMERGENCY MEDICINE

## 2021-12-31 PROCEDURE — 96365 THER/PROPH/DIAG IV INF INIT: CPT

## 2021-12-31 RX ORDER — CEFUROXIME AXETIL 500 MG/1
500 TABLET ORAL 2 TIMES DAILY
Qty: 20 TABLET | Refills: 0 | OUTPATIENT
Start: 2021-12-31 | End: 2022-03-08

## 2021-12-31 RX ORDER — SODIUM CHLORIDE 9 MG/ML
10 INJECTION INTRAVENOUS AS NEEDED
Status: DISCONTINUED | OUTPATIENT
Start: 2021-12-31 | End: 2021-12-31 | Stop reason: HOSPADM

## 2021-12-31 RX ORDER — LANOLIN ALCOHOL/MO/W.PET/CERES
50 CREAM (GRAM) TOPICAL DAILY
Status: DISCONTINUED | OUTPATIENT
Start: 2021-12-31 | End: 2021-12-31 | Stop reason: HOSPADM

## 2021-12-31 RX ADMIN — PYRIDOXINE HCL TAB 50 MG 50 MG: 50 TAB at 13:40

## 2021-12-31 RX ADMIN — SODIUM CHLORIDE 2000 ML: 9 INJECTION, SOLUTION INTRAVENOUS at 11:30

## 2021-12-31 RX ADMIN — SODIUM CHLORIDE 1 G: 900 INJECTION INTRAVENOUS at 13:40

## 2022-01-02 LAB — BACTERIA SPEC AEROBE CULT: ABNORMAL

## 2022-01-17 ENCOUNTER — ROUTINE PRENATAL (OUTPATIENT)
Dept: OBSTETRICS AND GYNECOLOGY | Facility: CLINIC | Age: 27
End: 2022-01-17

## 2022-01-17 VITALS — BODY MASS INDEX: 24.37 KG/M2 | SYSTOLIC BLOOD PRESSURE: 104 MMHG | DIASTOLIC BLOOD PRESSURE: 70 MMHG | WEIGHT: 137.6 LBS

## 2022-01-17 DIAGNOSIS — Z3A.10 10 WEEKS GESTATION OF PREGNANCY: Primary | ICD-10-CM

## 2022-01-17 DIAGNOSIS — Z87.440 HISTORY OF URINARY TRACT INFECTION: ICD-10-CM

## 2022-01-17 LAB
BILIRUB BLD-MCNC: NEGATIVE MG/DL
CLARITY, POC: CLEAR
COLOR UR: YELLOW
EXPIRATION DATE: 0
GLUCOSE UR STRIP-MCNC: NEGATIVE MG/DL
GLUCOSE UR STRIP-MCNC: NEGATIVE MG/DL
KETONES UR QL: NEGATIVE
LEUKOCYTE EST, POC: NEGATIVE
Lab: 0
NITRITE UR-MCNC: NEGATIVE MG/ML
PH UR: 7 [PH] (ref 5–8)
PROT UR STRIP-MCNC: NEGATIVE MG/DL
PROT UR STRIP-MCNC: NEGATIVE MG/DL
RBC # UR STRIP: NEGATIVE /UL
SP GR UR: 1.03 (ref 1–1.03)
UROBILINOGEN UR QL: NORMAL

## 2022-01-17 PROCEDURE — 0502F SUBSEQUENT PRENATAL CARE: CPT | Performed by: OBSTETRICS & GYNECOLOGY

## 2022-01-17 NOTE — PROGRESS NOTES
OB FOLLOW UP  CC- Here for care of pregnancy        Brittanie Bentley is a 26 y.o.  10w1d patient being seen today for her obstetrical follow up visit. Patient reports left sciatic nerve pain. Patient having CFDNA done today.   Her prenatal care is complicated by (and status) :    Patient Active Problem List   Diagnosis   • Pelvic pain   • Less than 8 weeks gestation of pregnancy       Flu Status: Declines  Ultrasound Today: No.    ROS -   Patient Reports : Sciatic nervce pain   Patient Denies: Loss of Fluid, Vaginal Spotting, Vision Changes, Headaches, Nausea  and Vomiting   Fetal Movement : Absent  All other systems reviewed and are negative.       The additional following portions of the patient's history were reviewed and updated as appropriate: allergies, current medications, past family history, past medical history, past social history, past surgical history and problem list.    I have reviewed and agree with the HPI, ROS, and historical information as entered above. Grant Bergeron MD    /70   Wt 62.4 kg (137 lb 9.6 oz)   LMP 2021   BMI 24.37 kg/m²       EXAM:     FHT: 160 BPM   Uterine Size: size equals dates  Pelvic Exam: No    Urine glucose/protein: See prenatal flowsheet       Assessment and Plan    Problem List Items Addressed This Visit     None      Visit Diagnoses     10 weeks gestation of pregnancy    -  Primary    Relevant Orders    POC Urinalysis Dipstick (Completed)    [id number] PLUS Core+SCA+ESS - Blood,          1. Pregnancy at 10w1d  2. Fetal status reassuring.   3. Activity and Exercise discussed.  Return in about 4 weeks (around 2022).    Grant Bergeron MD  2022

## 2022-01-26 ENCOUNTER — TELEPHONE (OUTPATIENT)
Dept: OBSTETRICS AND GYNECOLOGY | Facility: CLINIC | Age: 27
End: 2022-01-26

## 2022-01-26 DIAGNOSIS — O23.41 URINARY TRACT INFECTION IN MOTHER DURING FIRST TRIMESTER OF PREGNANCY: Primary | ICD-10-CM

## 2022-01-26 RX ORDER — NITROFURANTOIN 25; 75 MG/1; MG/1
100 CAPSULE ORAL 2 TIMES DAILY
Qty: 14 CAPSULE | Refills: 0 | Status: SHIPPED | OUTPATIENT
Start: 2022-01-26 | End: 2022-02-02

## 2022-01-26 NOTE — TELEPHONE ENCOUNTER
Patient reporting symptoms started 2 days ago; currently with frequency, lower abdominal/pelvic pain, and dysuria.    Had UTI 12/31 and was prescribed augmentin which made her nauseous and vomitus. Eventually patient had to go to the ER d/t dehydration and they gave her ceftin and rocephin. Reviewed all details with MD.    Per Dr. Bergeron, macrobid 100mg BID x7 days. Pharmacy verified.

## 2022-01-26 NOTE — TELEPHONE ENCOUNTER
Patient states that she is having a really bad uti and she states that she would like to know if she could get a script for Ceftin 500 mg called into her [facility] that is on file?    Please advise and call the patient back at [phone].

## 2022-02-02 ENCOUNTER — ROUTINE PRENATAL (OUTPATIENT)
Dept: OBSTETRICS AND GYNECOLOGY | Facility: CLINIC | Age: 27
End: 2022-02-02

## 2022-02-02 ENCOUNTER — TELEPHONE (OUTPATIENT)
Dept: OBSTETRICS AND GYNECOLOGY | Facility: CLINIC | Age: 27
End: 2022-02-02

## 2022-02-02 VITALS — SYSTOLIC BLOOD PRESSURE: 110 MMHG | WEIGHT: 133.6 LBS | DIASTOLIC BLOOD PRESSURE: 60 MMHG | BODY MASS INDEX: 23.67 KG/M2

## 2022-02-02 DIAGNOSIS — Z3A.12 12 WEEKS GESTATION OF PREGNANCY: Primary | ICD-10-CM

## 2022-02-02 DIAGNOSIS — R42 DIZZY SPELLS: ICD-10-CM

## 2022-02-02 PROBLEM — Z3A.01 LESS THAN 8 WEEKS GESTATION OF PREGNANCY: Status: RESOLVED | Noted: 2021-12-20 | Resolved: 2022-02-02

## 2022-02-02 PROBLEM — H66.90 OTITIS: Status: ACTIVE | Noted: 2022-02-02

## 2022-02-02 PROBLEM — H92.02 LEFT EAR PAIN: Status: ACTIVE | Noted: 2022-02-02

## 2022-02-02 LAB
ERYTHROCYTE [DISTWIDTH] IN BLOOD BY AUTOMATED COUNT: 13.5 % (ref 12.3–15.4)
EXPIRATION DATE: 0
GLUCOSE SERPL-MCNC: 61 MG/DL (ref 65–99)
GLUCOSE UR STRIP-MCNC: NEGATIVE MG/DL
HCT VFR BLD AUTO: 39.1 % (ref 34–46.6)
HGB BLD-MCNC: 12.8 G/DL (ref 12–15.9)
Lab: 0
MCH RBC QN AUTO: 29.1 PG (ref 26.6–33)
MCHC RBC AUTO-ENTMCNC: 32.7 G/DL (ref 31.5–35.7)
MCV RBC AUTO: 88.9 FL (ref 79–97)
PLATELET # BLD AUTO: 187 10*3/MM3 (ref 140–450)
PROT UR STRIP-MCNC: NEGATIVE MG/DL
RBC # BLD AUTO: 4.4 10*6/MM3 (ref 3.77–5.28)
WBC # BLD AUTO: 6.33 10*3/MM3 (ref 3.4–10.8)

## 2022-02-02 PROCEDURE — 0502F SUBSEQUENT PRENATAL CARE: CPT | Performed by: OBSTETRICS & GYNECOLOGY

## 2022-02-02 RX ORDER — AZITHROMYCIN 250 MG/1
TABLET, FILM COATED ORAL
Qty: 6 TABLET | Refills: 0 | OUTPATIENT
Start: 2022-02-02 | End: 2022-03-08

## 2022-02-02 RX ORDER — AZITHROMYCIN 250 MG/1
TABLET, FILM COATED ORAL
Qty: 6 TABLET | Refills: 0 | Status: SHIPPED | OUTPATIENT
Start: 2022-02-02 | End: 2022-02-02

## 2022-02-02 NOTE — PROGRESS NOTES
"OB FOLLOW UP    Brittanie Bentley is a 26 y.o.  12w3d patient being seen today for her obstetrical follow up visit. Patient reports following a shower last pm pt reports feeling faint and passed out and fell to the floor hitting the left side of her head. Both ears are \"feeling full\" and feels muffled like a \"fish bowl\".  Difficulty hearing-hearing loss in left ear.  .     Her prenatal care is complicated by (and status) : fall    ROS -   Contractions No   problems No  GI problems No  Bleeding or Leaking No  Fetal Movement : too early      Current Outpatient Medications:   •  nitrofurantoin, macrocrystal-monohydrate, (Macrobid) 100 MG capsule, Take 1 capsule by mouth 2 (Two) Times a Day for 7 days., Disp: 14 capsule, Rfl: 0  •  Prenatal Vit-Fe Fumarate-FA (PRENATAL ) 27-1 MG tablet tablet, Take 1 tablet by mouth Daily., Disp: , Rfl:   •  azithromycin (Zithromax Z-Dae) 250 MG tablet, Take 2 tablets (500 mg) on  Day 1,  followed by 1 tablet (250 mg) once daily on Days 2 through 5., Disp: 6 tablet, Rfl: 0  •  cefuroxime (CEFTIN) 500 MG tablet, Take 1 tablet by mouth 2 (Two) Times a Day., Disp: 20 tablet, Rfl: 0    Current Facility-Administered Medications:   •  phenylephrine (HUI-SYNEPHRINE) 0.5 % nasal spray 1 spray, 1 spray, Each Nare, Q6H PRN, Grant Bergeron MD    /60   Wt 60.6 kg (133 lb 9.6 oz)   LMP 2021   BMI 23.67 kg/m²     FHT:  150 BPM    Uterine Size: size equals dates   Presentations: unsure        Uterus-nontender     TM clear on the right; cloudy on the left with ear pain  Assessment    1. Pregnancy at 12w3d  2. Fetal status reassuring   Try Z-Dae and Afrin spray  Problem List Items Addressed This Visit        Gravid and     12 weeks gestation of pregnancy - Primary    Relevant Medications    phenylephrine (HUI-SYNEPHRINE) 0.5 % nasal spray 1 spray    Other Relevant Orders    POC Protein, Urine, Qualitative, Dipstick (Completed)    POC Glucose, Urine, Qualitative, " Dipstick (Completed)    CBC (No Diff)    Glucose, Random       Symptoms and Signs    Dizzy spells    Relevant Medications    phenylephrine (HUI-SYNEPHRINE) 0.5 % nasal spray 1 spray    Other Relevant Orders    CBC (No Diff)    Glucose, Random          Plan    1. P/patient return in 4 weeks  2. Prenatal teaching done and patient questions were answered  3. If patient continues to have syncope will send to neurology    Grant Bergeron MD  02/02/2022

## 2022-02-02 NOTE — TELEPHONE ENCOUNTER
Patient called and reported that she has been experiencing dizziness, lightheadedness for last week or so. Stated that she got dizzy in shower last night and fell onto belly, has lost hearing in one ear (side that her head fell on). This PAC scheduled her to be seen today.

## 2022-02-14 ENCOUNTER — ROUTINE PRENATAL (OUTPATIENT)
Dept: OBSTETRICS AND GYNECOLOGY | Facility: CLINIC | Age: 27
End: 2022-02-14

## 2022-02-14 VITALS — WEIGHT: 132 LBS | DIASTOLIC BLOOD PRESSURE: 60 MMHG | SYSTOLIC BLOOD PRESSURE: 100 MMHG | BODY MASS INDEX: 23.38 KG/M2

## 2022-02-14 DIAGNOSIS — Z3A.14 14 WEEKS GESTATION OF PREGNANCY: Primary | ICD-10-CM

## 2022-02-14 PROBLEM — Z34.90 PREGNANCY: Status: ACTIVE | Noted: 2022-02-14

## 2022-02-14 LAB
GLUCOSE UR STRIP-MCNC: NEGATIVE MG/DL
PROT UR STRIP-MCNC: NEGATIVE MG/DL

## 2022-02-14 PROCEDURE — 0502F SUBSEQUENT PRENATAL CARE: CPT | Performed by: OBSTETRICS & GYNECOLOGY

## 2022-02-14 NOTE — PROGRESS NOTES
OB FOLLOW UP  CC- Here for care of pregnancy        Brittanie Bentley is a 26 y.o.  14w1d patient being seen today for her obstetrical follow up visit. Patient reports no complaints. At her last visit she c/o syncope getting out of the shower. Since then she has increased her calorie intake and is conscience of how she moves and she feels much better.    Her prenatal care is complicated by (and status) :    Patient Active Problem List   Diagnosis   • Pelvic pain   • 12 weeks gestation of pregnancy   • Dizzy spells   • Left ear pain   • Otitis   • Pregnancy       Desires genetic testing?: Yes with Gender  Flu Status: Desires at future appt  Ultrasound Today: No.    ROS -   Patient Reports : No Problems  Patient Denies: Loss of Fluid, Vaginal Spotting, Vision Changes and Headaches  Fetal Movement : absent  All other systems reviewed and are negative.     The additional following portions of the patient's history were reviewed and updated as appropriate: allergies, current medications, past family history, past medical history, past social history, past surgical history and problem list.    I have reviewed and agree with the HPI, ROS, and historical information as entered above. Michael Fontenot MD    /60   Wt 59.9 kg (132 lb)   LMP 2021   BMI 23.38 kg/m²         EXAM:     FHT: 160 BPM   Uterine Size: size equals dates  Pelvic Exam: No       Assessment and Plan    Problem List Items Addressed This Visit            Pregnancy - Primary    Overview     Cell free DNA screen; low risk; male.          Relevant Orders    POC Urinalysis Dipstick (Completed)          1. Pregnancy at 14w1d; Cell free DNA screen low risk; ,male.   2. Anomaly scan in 5 wks.   3. Labs reviewed from New OB Visit.  4. Activity and Exercise discussed.  Return in about 5 weeks (around 3/21/2022) for Anomaly scan ultrasound.    Michael Fontenot MD  2022

## 2022-03-21 ENCOUNTER — ROUTINE PRENATAL (OUTPATIENT)
Dept: OBSTETRICS AND GYNECOLOGY | Facility: CLINIC | Age: 27
End: 2022-03-21

## 2022-03-21 VITALS — DIASTOLIC BLOOD PRESSURE: 60 MMHG | WEIGHT: 142.6 LBS | SYSTOLIC BLOOD PRESSURE: 100 MMHG | BODY MASS INDEX: 24.48 KG/M2

## 2022-03-21 DIAGNOSIS — Z3A.19 19 WEEKS GESTATION OF PREGNANCY: Primary | ICD-10-CM

## 2022-03-21 DIAGNOSIS — Z36.89 SCREENING, ANTENATAL, FOR FETAL ANATOMIC SURVEY: ICD-10-CM

## 2022-03-21 DIAGNOSIS — M54.50 MIDLINE LOW BACK PAIN, UNSPECIFIED CHRONICITY, UNSPECIFIED WHETHER SCIATICA PRESENT: ICD-10-CM

## 2022-03-21 LAB
EXPIRATION DATE: 0
GLUCOSE UR STRIP-MCNC: NEGATIVE MG/DL
Lab: 0
PROT UR STRIP-MCNC: NEGATIVE MG/DL

## 2022-03-21 PROCEDURE — 0502F SUBSEQUENT PRENATAL CARE: CPT | Performed by: OBSTETRICS & GYNECOLOGY

## 2022-03-21 NOTE — PROGRESS NOTES
OB FOLLOW UP    Brittanie Bentley is a 26 y.o.  19w1d patient being seen today for her obstetrical follow up visit. Patient reports mild heartburn (antacids help a little) and lower backache that radiates to pelvic region (prenatal massage helped for a day). Patient reports the back pain has been present for over a week; unisom puts her to sleep but won't keep her asleep. Anatomical U/S today.    Her prenatal care is complicated by (and status): bipolar II, rubella non-immune    ROS -   Contractions: no   problems: no  GI problems: see above  Bleeding or Leaking: no  Fetal Movement: present      Current Outpatient Medications:   •  Prenatal Vit-Fe Fumarate-FA (PRENATAL -) 27-1 MG tablet tablet, Take 1 tablet by mouth Daily., Disp: , Rfl:     /60   Wt 64.7 kg (142 lb 9.6 oz)   LMP 2021   BMI 24.48 kg/m²     FHT:  150BPM    Uterine Size: size equals dates   Presentations: unsure        Uterus-nontender   Assessment    1. Pregnancy at 19w1d  2. Fetal status reassuring     Problem List Items Addressed This Visit        Gravid and     Pregnancy - Primary    Overview     Cell free DNA screen; low risk; male.            Relevant Orders    POC Glucose, Urine, Qualitative, Dipstick (Completed)    POC Protein, Urine, Qualitative, Dipstick (Completed)    Ambulatory Referral to Physical Therapy Evaluate and treat, Women's Health      Other Visit Diagnoses     Midline low back pain, unspecified chronicity, unspecified whether sciatica present        Relevant Orders    Ambulatory Referral to Physical Therapy Evaluate and treat, Women's Health        Physical therapy for back time  Plan    1. P/patient return in 4 weeks  2. Prenatal teaching done and patient questions were answered  3. Patient transferring care to Dr. Siobhan Bergeron MD  2022

## 2022-04-18 ENCOUNTER — ROUTINE PRENATAL (OUTPATIENT)
Dept: OBSTETRICS AND GYNECOLOGY | Facility: CLINIC | Age: 27
End: 2022-04-18

## 2022-04-18 VITALS — DIASTOLIC BLOOD PRESSURE: 70 MMHG | SYSTOLIC BLOOD PRESSURE: 118 MMHG | BODY MASS INDEX: 25.71 KG/M2 | WEIGHT: 149.8 LBS

## 2022-04-18 DIAGNOSIS — Z3A.23 23 WEEKS GESTATION OF PREGNANCY: Primary | ICD-10-CM

## 2022-04-18 LAB
GLUCOSE UR STRIP-MCNC: NEGATIVE MG/DL
PROT UR STRIP-MCNC: NEGATIVE MG/DL

## 2022-04-18 PROCEDURE — 0502F SUBSEQUENT PRENATAL CARE: CPT | Performed by: NURSE PRACTITIONER

## 2022-04-18 NOTE — PROGRESS NOTES
OB FOLLOW UP  CC- Here for care of pregnancy        Brittanie Bentley is a 26 y.o.  23w1d patient being seen today for her obstetrical follow up visit. Patient reports dizzy spells today. She states that she had dizzy spells early on in pregnancy as well, but they went away. She has been staying hydrated.  She also reports swelling in feet, legs, and hands that started a couple of months ago. She states that swelling has worsened within the past couple of weeks.     Her prenatal care is complicated by (and status) :    Patient Active Problem List   Diagnosis   • Pelvic pain   • 12 weeks gestation of pregnancy   • Dizzy spells   • Left ear pain   • Otitis   • Pregnancy       Flu Status: Declines  Ultrasound Today: No.    ROS -   Patient Reports : Dizziness and Swelling   Patient Denies: Loss of Fluid, Vaginal Spotting, Vision Changes, Headaches, Nausea  and Vomiting   Fetal Movement : normal  All other systems reviewed and are negative.       The additional following portions of the patient's history were reviewed and updated as appropriate: allergies, current medications, past family history, past medical history, past social history, past surgical history and problem list.    I have reviewed and agree with the HPI, ROS, and historical information as entered above. Ambreen Gautam, APRN    /70   Wt 67.9 kg (149 lb 12.8 oz)   LMP 2021   BMI 25.71 kg/m²       EXAM:     FHT: 140 BPM   Uterine Size: 23 cm  Pelvic Exam: No    Urine glucose/protein: See prenatal flowsheet       Assessment and Plan    Problem List Items Addressed This Visit        Gravid and     Pregnancy - Primary    Overview     Cell free DNA screen; low risk; male.            Relevant Orders    POC Urinalysis Dipstick (Completed)          1. Pregnancy at 23w1d  2. Fetal status reassuring.   3. Encouraged adequate hydration and well balanced diet. Call if dizziness continues.   Return in about 4 weeks (around 2022) for 1 hr  gtt.    Ambreen Gautam, APRN  04/18/2022

## 2022-05-20 ENCOUNTER — ROUTINE PRENATAL (OUTPATIENT)
Dept: OBSTETRICS AND GYNECOLOGY | Facility: CLINIC | Age: 27
End: 2022-05-20

## 2022-05-20 VITALS — WEIGHT: 156.6 LBS | BODY MASS INDEX: 26.88 KG/M2 | SYSTOLIC BLOOD PRESSURE: 100 MMHG | DIASTOLIC BLOOD PRESSURE: 60 MMHG

## 2022-05-20 DIAGNOSIS — Z3A.27 27 WEEKS GESTATION OF PREGNANCY: Primary | ICD-10-CM

## 2022-05-20 PROBLEM — H92.02 LEFT EAR PAIN: Status: RESOLVED | Noted: 2022-02-02 | Resolved: 2022-05-20

## 2022-05-20 PROBLEM — H66.90 OTITIS: Status: RESOLVED | Noted: 2022-02-02 | Resolved: 2022-05-20

## 2022-05-20 PROBLEM — R42 DIZZY SPELLS: Status: RESOLVED | Noted: 2022-02-02 | Resolved: 2022-05-20

## 2022-05-20 PROBLEM — Z3A.12 12 WEEKS GESTATION OF PREGNANCY: Status: RESOLVED | Noted: 2022-02-02 | Resolved: 2022-05-20

## 2022-05-20 LAB
GLUCOSE UR STRIP-MCNC: NEGATIVE MG/DL
PROT UR STRIP-MCNC: NEGATIVE MG/DL

## 2022-05-20 PROCEDURE — 0502F SUBSEQUENT PRENATAL CARE: CPT | Performed by: OBSTETRICS & GYNECOLOGY

## 2022-05-20 PROCEDURE — 90715 TDAP VACCINE 7 YRS/> IM: CPT | Performed by: OBSTETRICS & GYNECOLOGY

## 2022-05-20 PROCEDURE — 90471 IMMUNIZATION ADMIN: CPT | Performed by: OBSTETRICS & GYNECOLOGY

## 2022-05-20 NOTE — PROGRESS NOTES
OB FOLLOW UP  CC- Here for care of pregnancy        Brittanie Bentley is a 26 y.o.  27w5d patient being seen today for her obstetrical follow up. Patient reports occasional headaches, swelling, and pelvic pain.  GTT/28wk teaching/TDAP today. She was a patient of Dr. Bergeron, this will be her first visit with Dr. Mccann.       Patient undergoing Glucola testing today. She is due for her testing at 10:38am.     MBT: A+  Rhogam Given: N/A  TDAP: given today  Ultrasound Today: No.  Pt has ordered breast pump    Her prenatal care is complicated by (and status) :    Patient Active Problem List   Diagnosis   • Pelvic pain   • Pregnancy         ROS -   Patient Denies: Loss of Fluid, Vaginal Spotting, Vision Changes, Nausea , Vomiting , Contractions and Epigastric pain  Fetal Movement : normal    The additional following portions of the patient's history were reviewed and updated as appropriate: allergies, current medications, past family history, past medical history, past social history, past surgical history and problem list.    I have reviewed and agree with the HPI, ROS, and historical information as entered above. Greta Mccann MD    /60   Wt 71 kg (156 lb 9.6 oz)   LMP 2021   BMI 26.88 kg/m²         EXAM:     FHT: + BPM   Uterine Size: size equals dates  Pelvic Exam: No       Assessment and Plan    Problem List Items Addressed This Visit     Pregnancy - Primary    Overview     Cell free DNA screen; low risk; male.   Prev  term 6#10oz           Relevant Orders    POC Urinalysis Dipstick (Completed)    Antibody Screen    CBC (No Diff)    Gestational Screen 1 Hr ([facility])    Ambulatory Referral to Physical Therapy Pelvic Floor          1. Pregnancy at 27w5d. glucola and tdap today.   2. referal to pelvic floor PT   3. Fetal status reassuring.   4. Activity and Exercise discussed.  Return in about 1 month (around 2022) for F/U Prenatal.    Greta Mccann MD  2022

## 2022-05-21 LAB
BLD GP AB SCN SERPL QL: NEGATIVE
ERYTHROCYTE [DISTWIDTH] IN BLOOD BY AUTOMATED COUNT: 12.4 % (ref 12.3–15.4)
GLUCOSE 1H P 50 G GLC PO SERPL-MCNC: 117 MG/DL (ref 65–139)
HCT VFR BLD AUTO: 31.5 % (ref 34–46.6)
HGB BLD-MCNC: 10.6 G/DL (ref 12–15.9)
MCH RBC QN AUTO: 27.6 PG (ref 26.6–33)
MCHC RBC AUTO-ENTMCNC: 33.7 G/DL (ref 31.5–35.7)
MCV RBC AUTO: 82 FL (ref 79–97)
PLATELET # BLD AUTO: 171 10*3/MM3 (ref 140–450)
RBC # BLD AUTO: 3.84 10*6/MM3 (ref 3.77–5.28)
WBC # BLD AUTO: 9.53 10*3/MM3 (ref 3.4–10.8)

## 2022-05-23 ENCOUNTER — ROUTINE PRENATAL (OUTPATIENT)
Dept: OBSTETRICS AND GYNECOLOGY | Facility: CLINIC | Age: 27
End: 2022-05-23

## 2022-05-23 ENCOUNTER — TELEPHONE (OUTPATIENT)
Dept: OBSTETRICS AND GYNECOLOGY | Facility: CLINIC | Age: 27
End: 2022-05-23

## 2022-05-23 VITALS — WEIGHT: 156 LBS | DIASTOLIC BLOOD PRESSURE: 70 MMHG | BODY MASS INDEX: 26.78 KG/M2 | SYSTOLIC BLOOD PRESSURE: 96 MMHG

## 2022-05-23 DIAGNOSIS — Z3A.28 28 WEEKS GESTATION OF PREGNANCY: Primary | ICD-10-CM

## 2022-05-23 DIAGNOSIS — R10.2 PELVIC PAIN: ICD-10-CM

## 2022-05-23 DIAGNOSIS — M54.50 ACUTE BILATERAL LOW BACK PAIN, UNSPECIFIED WHETHER SCIATICA PRESENT: ICD-10-CM

## 2022-05-23 DIAGNOSIS — R82.998 LEUKOCYTES IN URINE: ICD-10-CM

## 2022-05-23 LAB
BILIRUB BLD-MCNC: NEGATIVE MG/DL
CLARITY, POC: CLEAR
COLOR UR: YELLOW
GLUCOSE UR STRIP-MCNC: NEGATIVE MG/DL
KETONES UR QL: NEGATIVE
LEUKOCYTE EST, POC: ABNORMAL
NITRITE UR-MCNC: NEGATIVE MG/ML
PH UR: 7.5 [PH] (ref 5–8)
PROT UR STRIP-MCNC: NEGATIVE MG/DL
RBC # UR STRIP: NEGATIVE /UL
SP GR UR: 1.02 (ref 1–1.03)
UROBILINOGEN UR QL: NORMAL

## 2022-05-23 PROCEDURE — 0502F SUBSEQUENT PRENATAL CARE: CPT | Performed by: NURSE PRACTITIONER

## 2022-05-23 RX ORDER — AMOXICILLIN AND CLAVULANATE POTASSIUM 500; 125 MG/1; MG/1
1 TABLET, FILM COATED ORAL 3 TIMES DAILY
Qty: 15 TABLET | Refills: 0 | Status: SHIPPED | OUTPATIENT
Start: 2022-05-23 | End: 2022-07-08

## 2022-05-23 NOTE — TELEPHONE ENCOUNTER
Dr. Mccann OB pt.   28w1d    Patient C/O: magy ramirez that are intermittent, severe vaginal pressure, headaches that started last night and tylenol has not helped, severe vaginal pain and when baby moves she states the vaginal pain worsens and lower back pain.   Patient was concerned and upset. I advised patient to come into the office for possible NST and further evaluation. She v/u.     Baby movement: present    Patient denies vaginal bleeding, LOF, vision changes.

## 2022-05-23 NOTE — PROGRESS NOTES
OB FOLLOW UP  CC- Here for care of pregnancy        Brittanie Bentley is a 26 y.o.  28w1d patient being seen today for her obstetrical follow up visit. Patient reports backache, fatigue, headache, nausea and pain in pelvic area when baby moves. [place] out of the car, and before she realized how heavy it was the cooler was already about to fall. Patient states that she had a little cramping and back pain after that but was not worry some. . Patient c/o magy ramirez that are intermittent, severe vaginal pressure, headaches that started last night and tylenol has not helped, severe vaginal pain that feels like sharp shooting pains. When the baby moves she states the vaginal pain and lower back pain worsen.      Pt states she was getting ready to start PT for pelvic floor dysfunction and was going to have surgery for endometriosis when she found out she was pregnant. She is wondering if these things could cause more pressure with this pregnancy. She did not experience this with her first pregnancy.    Her prenatal care is complicated by (and status) :    Patient Active Problem List   Diagnosis   • Pelvic pain   • Pregnancy       Flu Status: Declines  Ultrasound Today: No.    NST for [place] Ramirez: Reactive; No contractions, no decels    ROS -   Patient Reports : Headaches, Nausea and magy ramirez (tightening in belly)  Patient Denies: Loss of Fluid, Vaginal Spotting, Vision Changes, Vomiting , Contractions and Epigastric pain  Fetal Movement : normal  All other systems reviewed and are negative.       The additional following portions of the patient's history were reviewed and updated as appropriate: allergies, current medications, past family history, past medical history, past social history, past surgical history and problem list.    I have reviewed and agree with the HPI, ROS, and historical information as entered above. Misael MAGUIRE  Claudia, APRN    BP 96/70   Wt 70.8 kg (156 lb)   LMP 2021   BMI 26.78 kg/m²       EXAM:     FHT:  positive  Uterine Size: n/a  Pelvic Exam: Yes Dilation: Closed    Urine glucose/protein: See prenatal flowsheet       Assessment and Plan    Problem List Items Addressed This Visit        Gravid and     Pregnancy - Primary    Overview     Cell free DNA screen; low risk; male.   Prev  term 6#10oz             Other Visit Diagnoses     Acute bilateral low back pain, unspecified whether sciatica present        Relevant Orders    POC Urinalysis Dipstick (Completed)    Urine Culture - Urine, Urine, Clean Catch          1. Pregnancy at 28w1d  2. CCUA with + leukocytes. Send for culture. Augmentin Rx sent. Increase fluid intake.  3. Fetal status reassuring. NST reactive.  4. Activity and Exercise discussed.  5. Signs of  labor discussed.  6. Belly binder for increased pelvic pressure.  7. Keep next scheduled appointment with Dr. Mccann 22    Misael Taylor, APRN  2022

## 2022-05-27 LAB
BACTERIA UR CULT: ABNORMAL
BACTERIA UR CULT: ABNORMAL
OTHER ANTIBIOTIC SUSC ISLT: ABNORMAL

## 2022-06-13 ENCOUNTER — TELEPHONE (OUTPATIENT)
Dept: OBSTETRICS AND GYNECOLOGY | Facility: CLINIC | Age: 27
End: 2022-06-13

## 2022-06-13 RX ORDER — CYCLOBENZAPRINE HCL 10 MG
10 TABLET ORAL 3 TIMES DAILY PRN
Qty: 30 TABLET | Refills: 1 | Status: SHIPPED | OUTPATIENT
Start: 2022-06-13 | End: 2022-07-20 | Stop reason: HOSPADM

## 2022-06-13 NOTE — TELEPHONE ENCOUNTER
Dr. Mccann OB pt.   31w1d    S/w pt she states before pregnancy she was having pelvic floor problems and was suppose to have surgery on 1/21/2022 for suspected endometriosis but was cancelled due to getting pregnant. Patient states she currently sees physical therapist at [facility] physical therapy for pelvic floor pain and back pain but can only be seen at random times when she has cancellations. Patient states she has only been seen twice for PT and has been trying tylenol , unisom and vitamin b6 OTC for the pelvic floor pain and back pain and unisom to help her sleep and states this is not helping at all. Patient states she is not getting any sleep and these s/s are starting to have an affect on her work life and also home life with her other child.     Patient wanted to know if there is anything else she can take for this. I told patient I would discuss this with Dr. Mccann or NP and CB with recommendations. She v/u

## 2022-06-13 NOTE — TELEPHONE ENCOUNTER
Pt states she is doing PT and taking tylenol and Unisom to try and get some sleep but nothing is helping and she is excruciating pain all day and she needs to know if there is anything else she can do to elevate the pain so that she can get some sleep.     Please advise

## 2022-06-13 NOTE — TELEPHONE ENCOUNTER
João Callahan: flexeril 10mg 3x daily PRN #30 w/ 1 RF    S/w pt she v/u.    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 I told patient I would discuss this with Dr. Mccann and CB with plan of care. She v/u      Patient is aware medication being sent to pharmacy on file.

## 2022-06-23 ENCOUNTER — ROUTINE PRENATAL (OUTPATIENT)
Dept: OBSTETRICS AND GYNECOLOGY | Facility: CLINIC | Age: 27
End: 2022-06-23

## 2022-06-23 VITALS — DIASTOLIC BLOOD PRESSURE: 70 MMHG | BODY MASS INDEX: 27.91 KG/M2 | SYSTOLIC BLOOD PRESSURE: 105 MMHG | WEIGHT: 162.6 LBS

## 2022-06-23 DIAGNOSIS — Z3A.32 32 WEEKS GESTATION OF PREGNANCY: Primary | ICD-10-CM

## 2022-06-23 LAB
CLARITY, POC: CLEAR
COLOR UR: YELLOW
GLUCOSE UR STRIP-MCNC: NEGATIVE MG/DL
PROT UR STRIP-MCNC: ABNORMAL MG/DL

## 2022-06-23 PROCEDURE — 0502F SUBSEQUENT PRENATAL CARE: CPT | Performed by: OBSTETRICS & GYNECOLOGY

## 2022-06-23 NOTE — PROGRESS NOTES
OB FOLLOW UP  CC- Here for care of pregnancy        Brittanie Bentley is a 26 y.o.  32w4d patient being seen today for her obstetrical follow up visit. Patient reports heartburn and swelling in hands and lower extremities. Patient states she has had ongoing pelvic pain that was diagnosed as pubic symphysis dysfunction by PT. Patient reports she is doing exercises and taking flexeril for pain, but it is not helping. She states that sometimes she has difficulty walking or doing any activity due to the pain. Patient states that nothing alleviates the pain currently. She is has been to physical therapy twice and is on the cancellation schedule, she has not been in 2 weeks due to scheduling issues.      Her prenatal care is complicated by (and status) :    Patient Active Problem List   Diagnosis   • Pelvic pain   • Pregnancy         Ultrasound Today: No.    ROS -   Patient Reports : heartburn and pelvic pain   Patient Denies: Loss of Fluid, Vaginal Spotting, Vision Changes, Headaches, Nausea , Vomiting , Contractions and Epigastric pain  Fetal Movement : normal  All other systems reviewed and are negative.       The additional following portions of the patient's history were reviewed and updated as appropriate: allergies, current medications, past family history, past medical history, past social history, past surgical history and problem list.    I have reviewed and agree with the HPI, ROS, and historical information as entered above. Greta Mccann MD    /70   Wt 73.8 kg (162 lb 9.6 oz)   LMP 2021   BMI 27.91 kg/m²       EXAM:     FHT: + BPM   Uterine Size: size equals dates  Pelvic Exam: No    Urine glucose/protein: See prenatal flowsheet       Assessment and Plan    Problem List Items Addressed This Visit     Pregnancy - Primary    Overview     Cell free DNA screen; low risk; male.   Prev  term 6#10oz           Relevant Orders    POC Urinalysis Dipstick (Completed)    US Ob Follow Up  Transabdominal Approach          1. Pregnancy at 32w4d. US next time for growth  2. Will try and get in with different PT that has more avail for her pubic symphysis dysfunction.   3. Fetal status reassuring.   4. Activity and Exercise discussed.  Return in about 2 weeks (around 7/7/2022) for F/U Prenatal, U/S Next Visit.    Greta Mccann MD  06/23/2022

## 2022-06-24 ENCOUNTER — TELEPHONE (OUTPATIENT)
Dept: OBSTETRICS AND GYNECOLOGY | Facility: CLINIC | Age: 27
End: 2022-06-24

## 2022-06-24 NOTE — TELEPHONE ENCOUNTER
Dr. Mccann OB patient   32w5d    S/w patient- patient with questions about lab results. Patient questions answered and she v/u.

## 2022-07-01 ENCOUNTER — TELEPHONE (OUTPATIENT)
Dept: OBSTETRICS AND GYNECOLOGY | Facility: CLINIC | Age: 27
End: 2022-07-01

## 2022-07-01 NOTE — TELEPHONE ENCOUNTER
PT CALLED AND STATED THEY NEEDED PERMISSION TO DO AN INTERNAL EXAM ON THIS PT THAT HAD BEEN REFERRED TO THEM

## 2022-07-08 ENCOUNTER — ROUTINE PRENATAL (OUTPATIENT)
Dept: OBSTETRICS AND GYNECOLOGY | Facility: CLINIC | Age: 27
End: 2022-07-08

## 2022-07-08 VITALS — WEIGHT: 166 LBS | BODY MASS INDEX: 28.49 KG/M2 | DIASTOLIC BLOOD PRESSURE: 70 MMHG | SYSTOLIC BLOOD PRESSURE: 110 MMHG

## 2022-07-08 DIAGNOSIS — Z3A.34 34 WEEKS GESTATION OF PREGNANCY: Primary | ICD-10-CM

## 2022-07-08 PROCEDURE — 0502F SUBSEQUENT PRENATAL CARE: CPT | Performed by: OBSTETRICS & GYNECOLOGY

## 2022-07-08 NOTE — PROGRESS NOTES
"      OB FOLLOW UP  CC- Here for care of pregnancy        Brittanie Bentley is a 26 y.o.  34w5d patient being seen today for her obstetrical follow up visit. Patient reports she continues to have \"pubic bone pain\" from pubic symphysis dysfunction.  She feels the constant urge to urinate but then has difficulty starting stream.  She is going to [facility] twice weekly and getting taped that provides the most relief.  Urine sent for culture. She had BH ctx this morning that resolved but were about q 7 minutes.     Her prenatal care is complicated by (and status) :   Patient Active Problem List   Diagnosis   • Pelvic pain   • Pregnancy       Ultrasound Today: Yes; BPP 8/8, EFW 55th% small possible placental shelf noted,   ROS -   Patient Denies: Loss of Fluid, Vaginal Spotting, Vision Changes, Headaches, Nausea , Vomiting  and Epigastric pain  Fetal Movement : normal  All other systems reviewed and are negative.       The additional following portions of the patient's history were reviewed and updated as appropriate: allergies, current medications, past family history, past medical history, past social history, past surgical history and problem list.    I have reviewed and agree with the HPI, ROS, and historical information as entered above. Greta Mccann MD    /70   Wt 75.3 kg (166 lb)   LMP 2021   BMI 28.49 kg/m²       EXAM:     Prenatal Vitals  BP: 110/70  Weight: 75.3 kg (166 lb)   Fetal Heart Rate: 156                       Assessment and Plan    Problem List Items Addressed This Visit     Pregnancy - Primary    Overview     Cell free DNA screen; low risk; male.   Prev  term 6#10oz  EFW 34 wks 55%ile           Relevant Orders    Urine Culture - Urine, Urine, Clean Catch          1. Pregnancy at 34w5d  2. Fetal status reassuring.   3. GBS next visit  4. Activity and Exercise discussed.  5. Fetal movement/PTL or Labor precautions  6. sched 39 wk induction.   Return in about 2 weeks (around 2022) " for F/U Prenatal.    Greta Mccann MD  07/08/2022

## 2022-07-15 LAB
BACTERIA UR CULT: ABNORMAL
OTHER ANTIBIOTIC SUSC ISLT: ABNORMAL

## 2022-07-19 RX ORDER — AMOXICILLIN AND CLAVULANATE POTASSIUM 500; 125 MG/1; MG/1
1 TABLET, FILM COATED ORAL 2 TIMES DAILY
Qty: 14 TABLET | Refills: 0 | Status: SHIPPED | OUTPATIENT
Start: 2022-07-19 | End: 2022-07-26

## 2022-07-20 ENCOUNTER — HOSPITAL ENCOUNTER (OUTPATIENT)
Facility: HOSPITAL | Age: 27
Setting detail: OBSERVATION
Discharge: HOME | End: 2022-07-20
Attending: OBSTETRICS & GYNECOLOGY | Admitting: OBSTETRICS & GYNECOLOGY

## 2022-07-20 ENCOUNTER — HOSPITAL ENCOUNTER (OUTPATIENT)
Facility: HOSPITAL | Age: 27
End: 2022-07-20
Attending: OBSTETRICS & GYNECOLOGY | Admitting: OBSTETRICS & GYNECOLOGY

## 2022-07-20 ENCOUNTER — HOSPITAL ENCOUNTER (OUTPATIENT)
Facility: HOSPITAL | Age: 27
Setting detail: OBSERVATION
Discharge: HOME | End: 2022-07-20
Attending: INTERNAL MEDICINE | Admitting: OBSTETRICS & GYNECOLOGY
Payer: COMMERCIAL

## 2022-07-20 VITALS
WEIGHT: 165 LBS | SYSTOLIC BLOOD PRESSURE: 112 MMHG | HEART RATE: 98 BPM | HEIGHT: 63 IN | BODY MASS INDEX: 29.23 KG/M2 | DIASTOLIC BLOOD PRESSURE: 64 MMHG | RESPIRATION RATE: 18 BRPM | OXYGEN SATURATION: 97 % | TEMPERATURE: 98.2 F

## 2022-07-20 VITALS
HEIGHT: 63 IN | DIASTOLIC BLOOD PRESSURE: 82 MMHG | HEART RATE: 115 BPM | WEIGHT: 166 LBS | BODY MASS INDEX: 29.41 KG/M2 | SYSTOLIC BLOOD PRESSURE: 117 MMHG | RESPIRATION RATE: 18 BRPM | TEMPERATURE: 98.1 F

## 2022-07-20 PROBLEM — O47.03 FALSE LABOR BEFORE 37 COMPLETED WEEKS OF GESTATION IN THIRD TRIMESTER: Status: ACTIVE | Noted: 2022-07-20

## 2022-07-20 LAB
ABO GROUP BLD: NORMAL
ALBUMIN SERPL-MCNC: 3.5 G/DL (ref 3.5–5.2)
ALBUMIN/GLOB SERPL: 1.2 G/DL
ALP SERPL-CCNC: 170 U/L (ref 39–117)
ALT SERPL W P-5'-P-CCNC: 15 U/L (ref 1–33)
ANION GAP SERPL CALCULATED.3IONS-SCNC: 11 MMOL/L (ref 5–15)
AST SERPL-CCNC: 26 U/L (ref 1–32)
BILIRUB SERPL-MCNC: 0.3 MG/DL (ref 0–1.2)
BLD GP AB SCN SERPL QL: NEGATIVE
BUN SERPL-MCNC: 6 MG/DL (ref 6–20)
BUN/CREAT SERPL: 10.3 (ref 7–25)
CALCIUM SPEC-SCNC: 8.7 MG/DL (ref 8.6–10.5)
CHLORIDE SERPL-SCNC: 104 MMOL/L (ref 98–107)
CO2 SERPL-SCNC: 20 MMOL/L (ref 22–29)
CREAT SERPL-MCNC: 0.58 MG/DL (ref 0.57–1)
DEPRECATED RDW RBC AUTO: 41.3 FL (ref 37–54)
EGFRCR SERPLBLD CKD-EPI 2021: 128.2 ML/MIN/1.73
ERYTHROCYTE [DISTWIDTH] IN BLOOD BY AUTOMATED COUNT: 15 % (ref 12.3–15.4)
GLOBULIN UR ELPH-MCNC: 2.9 GM/DL
GLUCOSE SERPL-MCNC: 100 MG/DL (ref 65–99)
HCT VFR BLD AUTO: 30.2 % (ref 34–46.6)
HGB BLD-MCNC: 9.4 G/DL (ref 12–15.9)
MCH RBC QN AUTO: 23.6 PG (ref 26.6–33)
MCHC RBC AUTO-ENTMCNC: 31.1 G/DL (ref 31.5–35.7)
MCV RBC AUTO: 75.7 FL (ref 79–97)
PLATELET # BLD AUTO: 172 10*3/MM3 (ref 140–450)
PMV BLD AUTO: 11.6 FL (ref 6–12)
POTASSIUM SERPL-SCNC: 3.7 MMOL/L (ref 3.5–5.2)
PROT SERPL-MCNC: 6.4 G/DL (ref 6–8.5)
RBC # BLD AUTO: 3.99 10*6/MM3 (ref 3.77–5.28)
RH BLD: POSITIVE
SODIUM SERPL-SCNC: 135 MMOL/L (ref 136–145)
T&S EXPIRATION DATE: NORMAL
WBC NRBC COR # BLD: 11.06 10*3/MM3 (ref 3.4–10.8)

## 2022-07-20 PROCEDURE — 80053 COMPREHEN METABOLIC PANEL: CPT | Performed by: OBSTETRICS & GYNECOLOGY

## 2022-07-20 PROCEDURE — G0378 HOSPITAL OBSERVATION PER HR: HCPCS

## 2022-07-20 PROCEDURE — 86901 BLOOD TYPING SEROLOGIC RH(D): CPT | Performed by: OBSTETRICS & GYNECOLOGY

## 2022-07-20 PROCEDURE — 59025 FETAL NON-STRESS TEST: CPT | Performed by: OBSTETRICS & GYNECOLOGY

## 2022-07-20 PROCEDURE — 59025 FETAL NON-STRESS TEST: CPT

## 2022-07-20 PROCEDURE — 99218 PR INITIAL OBSERVATION CARE/DAY 30 MINUTES: CPT | Performed by: OBSTETRICS & GYNECOLOGY

## 2022-07-20 PROCEDURE — 99219 PR INITIAL OBSERVATION CARE/DAY 50 MINUTES: CPT | Performed by: OBSTETRICS & GYNECOLOGY

## 2022-07-20 PROCEDURE — 86850 RBC ANTIBODY SCREEN: CPT | Performed by: OBSTETRICS & GYNECOLOGY

## 2022-07-20 PROCEDURE — 36415 COLL VENOUS BLD VENIPUNCTURE: CPT | Performed by: OBSTETRICS & GYNECOLOGY

## 2022-07-20 PROCEDURE — 25010000002 CEFTRIAXONE PER 250 MG: Performed by: OBSTETRICS & GYNECOLOGY

## 2022-07-20 PROCEDURE — 86900 BLOOD TYPING SEROLOGIC ABO: CPT | Performed by: OBSTETRICS & GYNECOLOGY

## 2022-07-20 PROCEDURE — 85027 COMPLETE CBC AUTOMATED: CPT | Performed by: OBSTETRICS & GYNECOLOGY

## 2022-07-20 RX ORDER — ZOLPIDEM TARTRATE 5 MG/1
5 TABLET ORAL ONCE
Status: COMPLETED | OUTPATIENT
Start: 2022-07-20 | End: 2022-07-20

## 2022-07-20 RX ORDER — SODIUM CHLORIDE 0.9 % (FLUSH) 0.9 %
10 SYRINGE (ML) INJECTION EVERY 12 HOURS SCHEDULED
Status: DISCONTINUED | OUTPATIENT
Start: 2022-07-20 | End: 2022-07-20 | Stop reason: HOSPADM

## 2022-07-20 RX ORDER — SODIUM CHLORIDE 0.9 % (FLUSH) 0.9 %
1-10 SYRINGE (ML) INJECTION AS NEEDED
Status: DISCONTINUED | OUTPATIENT
Start: 2022-07-20 | End: 2022-07-20 | Stop reason: HOSPADM

## 2022-07-20 RX ORDER — FAMOTIDINE 20 MG/1
20 TABLET, FILM COATED ORAL 2 TIMES DAILY PRN
COMMUNITY
End: 2022-09-22

## 2022-07-20 RX ADMIN — SODIUM CHLORIDE 1 G: 900 INJECTION INTRAVENOUS at 13:27

## 2022-07-20 RX ADMIN — SODIUM CHLORIDE, POTASSIUM CHLORIDE, SODIUM LACTATE AND CALCIUM CHLORIDE 1000 ML: 600; 310; 30; 20 INJECTION, SOLUTION INTRAVENOUS at 12:30

## 2022-07-20 RX ADMIN — ZOLPIDEM TARTRATE 5 MG: 5 TABLET ORAL at 06:31

## 2022-07-20 NOTE — H&P
[facility]  Obstetric History and Physical    Referring Provider: Greta Mccann MD      Chief Complaint   Patient presents with   • Contractions       Subjective     Patient is a 26 y.o. female  currently at 36w3d, who presents with complaint of contractions.  Patient complain contractions every 3 to 5 minutes since 130 this morning without associated leaking of fluid or vaginal bleeding, fever, recent trauma, chest pain, and shortness of breath.  Patient presented to labor delivery this morning with same complaint was observed in discharge after no cervical change in maternal and fetal wellbeing established.  Patient recently diagnosed with a UTI by culture(Klebsiella).  The office called in prescription today however patient had not picked up Rx. .  Obstetrical history significant for previous term vaginal delivery.      The following portions of the patients history were reviewed and updated as appropriate: current medications, allergies, past medical history, past surgical history, past family history, past social history and problem list .       Prenatal Information:   Maternal Prenatal Labs  Blood Type No results found for: ABO   Rh Status No results found for: RH   Antibody Screen No results found for: ABSCRN   Gonnorhea No results found for: GCCX   Chlamydia No results found for: CLAMYDCU   RPR No results found for: RPR   Syphilis Antibody No results found for: SYPHILIS   Rubella No results found for: RUBELLAIGGIN   Hepatitis B Surface Antigen No results found for: HEPBSAG   HIV-1 Antibody No results found for: LABHIV1   Hepatitis C Antibody No results found for: HEPCAB   Rapid Urin Drug Screen No results found for: AMPMETHU, BARBITSCNUR, LABBENZSCN, LABMETHSCN, LABOPIASCN, THCURSCR, COCAINEUR, AMPHETSCREEN, PROPOXSCN, BUPRENORSCNU, METAMPSCNUR, OXYCODONESCN, TRICYCLICSCN   Group B Strep Culture No results found for: GBSANTIGEN           External Prenatal Results     Pregnancy Outside Results -  Transcribed From Office Records - See Scanned Records For Details     Test Value Date Time    ABO  A  21 1405    Rh  Positive  21 1405    Antibody Screen  Negative  22 1236       Negative  21 1405    Varicella IgG       Rubella  <0.90 index 21 1405    Hgb  10.6 g/dL 22 1236       12.8 g/dL 22 1132       13.1 g/dL 21 1109       12.6 g/dL 21 1405    Hct  31.5 % 22 1236       39.1 % 22 1132       38.8 % 21 1109       37.8 % 21 1405    Glucose Fasting GTT       Glucose Tolerance Test 1 hour ^ passed 123  16     Glucose Tolerance Test 3 hour       Gonorrhea (discrete)  Negative  21 1405    Chlamydia (discrete)  Negative  21 1405    RPR  Non Reactive  21 1405    VDRL       Syphilis Antibody       HBsAg  Negative  21 1405    Herpes Simplex Virus PCR       Herpes Simplex VIrus Culture       HIV  Non Reactive  21 1405    Hep C RNA Quant PCR       Hep C Antibody  <0.1 s/co ratio 21 1405    AFP       Group B Strep ^ Positive  16     GBS Susceptibility to Clindamycin       GBS Susceptibility to Erythromycin       Fetal Fibronectin       Genetic Testing, Maternal Blood             Drug Screening     Test Value Date Time    Urine Drug Screen ^ negative  16     Amphetamine Screen  Negative ng/mL 21 1405    Barbiturate Screen  Negative ng/mL 21 1405    Benzodiazepine Screen  Negative ng/mL 21 1405    Methadone Screen  Negative ng/mL 21 1405    Phencyclidine Screen  Negative ng/mL 21 1405    Opiates Screen       THC Screen       Cocaine Screen       Propoxyphene Screen  Negative ng/mL 21 1405    Buprenorphine Screen       Methamphetamine Screen       Oxycodone Screen       Tricyclic Antidepressants Screen             Legend    ^: Historical                          Past OB History:       OB History    Para Term  AB Living   3 1 1 0 1 1   SAB IAB Ectopic  Molar Multiple Live Births   1 0 0 0 0 1      # Outcome Date GA Lbr Porfirio/2nd Weight Sex Delivery Anes PTL Lv   3 Current            2 Term 10/11/16 39w1d 30:56 / 00:55 2995 g (6 lb 9.6 oz) F Vag-Spont EPI N JANNIE      Name: MAURO VEGA      Apgar1: 8  Apgar5: 9   1 SAB 12/2015               Past Medical History: Past Medical History:   Diagnosis Date   • Bipolar 2 disorder (HCC) Spring 2020   • History of abnormal cervical Papanicolaou smear    • Migraine       Past Surgical History Past Surgical History:   Procedure Laterality Date   • LAPAROSCOPIC CHOLECYSTECTOMY      age 16    • ORIF ELBOW FRACTURE Left     age 8   • PERCUTANEOUS PINNING ELBOW FRACTURE     • WISDOM TOOTH EXTRACTION        Family History: Family History   Problem Relation Age of Onset   • Uterine cancer Mother    • Graves' disease Mother    • Prostate cancer Maternal Grandfather    • Colon cancer Neg Hx    • Ovarian cancer Neg Hx    • Breast cancer Neg Hx       Social History:  reports that she has never smoked. She has never used smokeless tobacco.   reports previous alcohol use.   reports no history of drug use.                   General ROS Negative Findings:Headaches, Visual Changes, Epigastric pain, Anorexia, Nausia/Vomiting, ROM and Vaginal Bleeding    ROS     All other systems have been reviewed and are neg  Objective       Vital Signs Range for the last 24 hours  Temperature: Temp:  [98.1 °F (36.7 °C)-98.2 °F (36.8 °C)] 98.2 °F (36.8 °C)   Temp Source: Temp src: Oral   BP: BP: (112-117)/(64-82) 112/64   Pulse: Heart Rate:  [] 98   Respirations: Resp:  [18] 18   SPO2: SpO2:  [97 %] 97 %   O2 Amount (l/min):     O2 Devices     Weight: Weight:  [72.6 kg (160 lb)-75.3 kg (166 lb)] 74.8 kg (165 lb)     Physical Examination:   General:   alert, appears stated age and cooperative   Skin:   normal   HEENT:  Sclera clear   Lungs:      Heart:      Gastrointestinal:  Abdomen soft, gravid uterus, guarding benign exam.  Negative CVA  tenderness.   Lower Extremities:  Edema, calf tenderness   : Exam deferred.   Musculoskeletal:     Neuro:           Presentation: vtx   Cervix: Exam by: Method: sterile exam per RN   Dilation:  3+   Effacement: Cervical Effacement: 70%   Station:         Fetal Heart Rate Assessment   Method: Fetal HR Assessment Method: external   Beats/min: Fetal HR (beats/min): 130   Baseline: Fetal HR Baseline: normal range   Varibility: Fetal HR Variability: moderate (amplitude range 6 to 25 bpm)   Accels: Fetal HR Accelerations: greater than/equal to 15 bpm, lasting at least 15 seconds   Decels: Fetal HR Decelerations: absent   Tracing Category:       Uterine Assessment   Method: Method: external tocotransducer   Frequency (min): Contraction Frequency (Minutes): 6   Ctx Count in 10 min:     Duration:     Intensity: Contraction Intensity: moderate by palpation   Intensity by IUPC:     Resting Tone: Uterine Resting Tone: soft by palpation   Resting Tone by IUPC:     [place] Units:       Laboratory Results:   Lab Results (last 24 hours)     ** No results found for the last 24 hours. **        Radiology Review:   Imaging Results (Last 24 Hours)     ** No results found for the last 24 hours. **        Other Studies:    Assessment & Plan       * No active hospital problems. *        Assessment:  1.  Intrauterine pregnancy at 36w3d weeks gestation with reactive fetal status.    2.   contraction without evidence of  labor  3.  Recent diagnosis of UTI, culture Klebsiella.  She was prescribed Augmentin today.   4.      Plan:  1.  Observation, insert IV, IV induration, Rocephin IV piggyback, baseline labs, and reevaluate.  2. Plan of care has been reviewed with patient.  3.  Risks, benefits of treatment plan have been discussed.  4.  All questions have been answered.  5      Jeferson Bauer DO  2022  12:04 EDT

## 2022-07-20 NOTE — PROGRESS NOTES
Laborist    Patient seen and examined chart reviewed.  Patient states feels somewhat better.  Patient had leaking of fluid or vaginal bleeding.  Patient reports normal fetal activity.  No cervical change per RN.  CBC reveals mild anemia pregnancy, CMP within normal limits  Patient she 1 g of Rocephin IV piggyback.  IMP 1.  26-year-old -0-1-1 at 36 weeks 3 days              No evidence of active labor              UTI  PLAN          Discharged home with labor and UTI instructions.  Obtain Rx for Augmentin intake complete 7-day course.  Return for labor or fever, progression of UTI.  All questions answered patient care with plan.  Discussed  with Dr. Page.

## 2022-07-21 ENCOUNTER — LAB (OUTPATIENT)
Dept: LAB | Facility: HOSPITAL | Age: 27
End: 2022-07-21

## 2022-07-21 ENCOUNTER — TELEPHONE (OUTPATIENT)
Dept: OBSTETRICS AND GYNECOLOGY | Facility: CLINIC | Age: 27
End: 2022-07-21

## 2022-07-21 ENCOUNTER — ROUTINE PRENATAL (OUTPATIENT)
Dept: OBSTETRICS AND GYNECOLOGY | Facility: CLINIC | Age: 27
End: 2022-07-21

## 2022-07-21 VITALS — WEIGHT: 172.6 LBS | BODY MASS INDEX: 30.57 KG/M2 | SYSTOLIC BLOOD PRESSURE: 114 MMHG | DIASTOLIC BLOOD PRESSURE: 66 MMHG

## 2022-07-21 DIAGNOSIS — Z3A.36 36 WEEKS GESTATION OF PREGNANCY: Primary | ICD-10-CM

## 2022-07-21 DIAGNOSIS — O36.8130 DECREASED FETAL MOVEMENTS IN THIRD TRIMESTER, SINGLE OR UNSPECIFIED FETUS: ICD-10-CM

## 2022-07-21 DIAGNOSIS — Z3A.36 36 WEEKS GESTATION OF PREGNANCY: ICD-10-CM

## 2022-07-21 LAB
CLARITY, POC: CLEAR
COLOR UR: YELLOW
GLUCOSE UR STRIP-MCNC: NEGATIVE MG/DL
PROT UR STRIP-MCNC: NEGATIVE MG/DL

## 2022-07-21 PROCEDURE — 59025 FETAL NON-STRESS TEST: CPT | Performed by: NURSE PRACTITIONER

## 2022-07-21 PROCEDURE — 0502F SUBSEQUENT PRENATAL CARE: CPT | Performed by: NURSE PRACTITIONER

## 2022-07-21 PROCEDURE — 87081 CULTURE SCREEN ONLY: CPT

## 2022-07-21 NOTE — TELEPHONE ENCOUNTER
She needs to have her GBS done and cervix check at the apt already scheduled. Her CTX's stopped with fluids and treatment of UTI. She is wondering if she still needs to take the Augmentin (she took one pill last night) She has lost pieces of her mucus plug and some spotting after her exam on LH. She is feeling few and far between fetal movements. She is not sure the last time she felt the baby have a normal kick. She told them that yesterday on LH and they said that was normal with her freq CTS'x. Come in for NST and cancel apt for tomorrow.

## 2022-07-21 NOTE — TELEPHONE ENCOUNTER
Patient had went to L&D around 3:30 a.m. today.    She believed she was in labor, but they found her having an UTI and that she did lose her mucus plug.    She does have appt on 7/22 w/ CHERI Kwan and she wants to speak to nurse regarding her having appt.    Please advise.

## 2022-07-21 NOTE — PROGRESS NOTES
OB FOLLOW UP  CC- Here for care of pregnancy        Brittanie Bentley is a 26 y.o.  36w4d patient being seen today for her obstetrical follow up visit. Patient reports occasional contractions, pelvic pain, nausea, headaches and vaginal bleeding. Patient stated that she has experienced a decrease in her baby's movements. Patient stated that her contractions are mild and she was having them last night. Patient has been experiencing nausea. Patient stated that the vaginal bleeding has occurred because she is losing her mucus plug. Patient stated that her headaches are mild.    In LH last night--- 3/70 per note    Her prenatal care is complicated by (and status) :    Patient Active Problem List   Diagnosis   • Pelvic pain   • Pregnancy   • False labor before 37 completed weeks of gestation in third trimester         Flu Status: Declines  GBS Status: Done Today. She is not allergic to PCN.  Her Delivery Plan is: Desires IOL at 39wks. Scheduled  Ultrasound Today: No.    ROS -   Patient Denies: Loss of Fluid, Vision Changes and Vomiting   Fetal Movement : decreased  All other systems reviewed and are negative.       The additional following portions of the patient's history were reviewed and updated as appropriate: allergies and current medications.    I have reviewed and agree with the HPI, ROS, and historical information as entered above. Josseline Khoury Erica, APRN        /66   Wt 78.3 kg (172 lb 9.6 oz)   LMP 2021   BMI 30.57 kg/m²     EXAM:     FHT: wnl BPM   Uterine Size: 36 cm  Pelvic Exam: 3/70; vertex    Urine glucose/protein: See prenatal flowsheet       Assessment and Plan    Problem List Items Addressed This Visit        Gravid and     Pregnancy - Primary    Overview     Cell free DNA screen; low risk; male.   Prev  term 6#10oz  EFW 34 wks 55%ile           Relevant Orders    POC Urinalysis Dipstick (Completed)    Group B Streptococcus Culture - Swab, Vaginal/Rectum      Other  Visit Diagnoses     Decreased fetal movements in third trimester, single or unspecified fetus              1. Pregnancy at 36w4d  2. Fetal status reassuring.   3. Activity and Exercise discussed.  NST reactive.  No cervix change.    Josseline Maldonado, APRN  07/21/2022

## 2022-07-22 DIAGNOSIS — Z3A.37 37 WEEKS GESTATION OF PREGNANCY: Primary | ICD-10-CM

## 2022-07-24 LAB — BACTERIA SPEC AEROBE CULT: NORMAL

## 2022-07-25 ENCOUNTER — ROUTINE PRENATAL (OUTPATIENT)
Dept: OBSTETRICS AND GYNECOLOGY | Facility: CLINIC | Age: 27
End: 2022-07-25

## 2022-07-25 VITALS — BODY MASS INDEX: 30.15 KG/M2 | WEIGHT: 170.2 LBS | DIASTOLIC BLOOD PRESSURE: 72 MMHG | SYSTOLIC BLOOD PRESSURE: 116 MMHG

## 2022-07-25 DIAGNOSIS — O23.43 URINARY TRACT INFECTION IN MOTHER DURING THIRD TRIMESTER OF PREGNANCY: ICD-10-CM

## 2022-07-25 DIAGNOSIS — Z3A.37 37 WEEKS GESTATION OF PREGNANCY: Primary | ICD-10-CM

## 2022-07-25 LAB
BILIRUB BLD-MCNC: NEGATIVE MG/DL
CLARITY, POC: CLEAR
CLARITY, POC: CLEAR
COLOR UR: YELLOW
COLOR UR: YELLOW
GLUCOSE UR STRIP-MCNC: NEGATIVE MG/DL
GLUCOSE UR STRIP-MCNC: NEGATIVE MG/DL
KETONES UR QL: NEGATIVE
LEUKOCYTE EST, POC: ABNORMAL
NITRITE UR-MCNC: NEGATIVE MG/ML
PH UR: 7 [PH] (ref 5–8)
PROT UR STRIP-MCNC: NEGATIVE MG/DL
PROT UR STRIP-MCNC: NEGATIVE MG/DL
RBC # UR STRIP: ABNORMAL /UL
SP GR UR: 1.02 (ref 1–1.03)
UROBILINOGEN UR QL: NORMAL

## 2022-07-25 PROCEDURE — 59025 FETAL NON-STRESS TEST: CPT | Performed by: NURSE PRACTITIONER

## 2022-07-25 PROCEDURE — 0502F SUBSEQUENT PRENATAL CARE: CPT | Performed by: NURSE PRACTITIONER

## 2022-07-25 NOTE — PROGRESS NOTES
OB FOLLOW UP  CC- Here for care of pregnancy        Brittanie Bentley is a 26 y.o.  37w1d patient being seen today for her obstetrical follow up visit. Patient reports occasional contractions. Patient stated that she has contractions everyday that are consistent but goes away. Patient stated that she has been experiencing severe pelvic pain. Patient stated that she has difficulty walking. Patient stated that she is experiencing vaginal pressure.     Her prenatal care is complicated by (and status) :    Patient Active Problem List   Diagnosis   • Pelvic pain   • Pregnancy   • False labor before 37 completed weeks of gestation in third trimester         Flu Status: Declines  GBS Status: was already done and is negative.  Her Delivery Plan is: Desires IOL at 39wks. Scheduled  Ultrasound Today: No.    ROS -   Patient Reports : Contractions  Patient Denies: Loss of Fluid, Vaginal Spotting, Vision Changes, Headaches, Nausea , Vomiting  and Epigastric pain  Fetal Movement : decreased  All other systems reviewed and are negative.       The additional following portions of the patient's history were reviewed and updated as appropriate: allergies and current medications.    I have reviewed and agree with the HPI, ROS, and historical information as entered above. Chloe Kwan, APRN        /72   Wt 77.2 kg (170 lb 3.2 oz)   LMP 2021   BMI 30.15 kg/m²     EXAM:     FHT: pos BPM     Pelvic Exam: Yes Dilation: 2cm    Urine glucose/protein: See prenatal flowsheet       Assessment and Plan    Problem List Items Addressed This Visit        Gravid and     Pregnancy - Primary    Overview     Cell free DNA screen; low risk; male.   Prev  term 6#10oz  EFW 34 wks 55%ile           Relevant Orders    POC Urinalysis Dipstick (Completed)      Other Visit Diagnoses     Urinary tract infection in mother during third trimester of pregnancy        Relevant Orders    POC Urinalysis Dipstick (Completed)     Urine Culture - Urine, Urine, Clean Catch          1. Pregnancy at 37w1d  2. Fetal status reassuring.   3. Activity and Exercise discussed.  Return in about 1 week (around 8/1/2022) for EZEKIEL ROJAS.   Non Stress Test: minutes >20  non-stress test: NST: Reactive  indication: Decreased Fetal Movement  category: Category I  Kick counts and labor precautions reviewed  Urine culture sent for TRAVON today.     Chloe Kwan, APRN  07/25/2022

## 2022-07-26 ENCOUNTER — TELEPHONE (OUTPATIENT)
Dept: OBSTETRICS AND GYNECOLOGY | Facility: CLINIC | Age: 27
End: 2022-07-26

## 2022-07-26 NOTE — TELEPHONE ENCOUNTER
Dr. Mccann OB pt.   37w2d    S/w pt she states she seen her urine results in her mychart and was wondering if she needs to be on an antibiotic. I told patient I would discuss this with Dr. Mccann and CB with plan of care. She v/u    Pt also gave fax # for [facility] paperwork to be faxed to :     [phone]

## 2022-07-27 LAB
BACTERIA UR CULT: NORMAL
BACTERIA UR CULT: NORMAL

## 2022-07-27 NOTE — TELEPHONE ENCOUNTER
Dr. Mccann OB patient  37w3d    S/w patient- patient requesting urine culture results. Informed patient of those and she v/u.

## 2022-08-01 ENCOUNTER — ROUTINE PRENATAL (OUTPATIENT)
Dept: OBSTETRICS AND GYNECOLOGY | Facility: CLINIC | Age: 27
End: 2022-08-01

## 2022-08-01 VITALS — SYSTOLIC BLOOD PRESSURE: 118 MMHG | WEIGHT: 173.2 LBS | DIASTOLIC BLOOD PRESSURE: 74 MMHG | BODY MASS INDEX: 30.68 KG/M2

## 2022-08-01 DIAGNOSIS — Z3A.38 38 WEEKS GESTATION OF PREGNANCY: Primary | ICD-10-CM

## 2022-08-01 LAB
EXPIRATION DATE: NORMAL
GLUCOSE UR STRIP-MCNC: NEGATIVE MG/DL
Lab: NORMAL
PROT UR STRIP-MCNC: NEGATIVE MG/DL

## 2022-08-01 PROCEDURE — 0502F SUBSEQUENT PRENATAL CARE: CPT

## 2022-08-01 NOTE — PROGRESS NOTES
OB FOLLOW UP  CC- Here for care of pregnancy        Brittanie Benltey is a 26 y.o.  38w1d patient being seen today for her obstetrical follow up visit. Patient reports swelling in her hands, feet and face.  She also reports frequent, but irregular and inconsistent magy ramirez/contractions.   She also reports continued pelvic pain and pressure.     She denies LOF, vaginal spotting, headaches, vision changes.  She reports adequate fetal movements.     Her prenatal care is complicated by (and status) : None  Patient Active Problem List   Diagnosis   • Pelvic pain   • Pregnancy   • False labor before 37 completed weeks of gestation in third trimester       GBS Status: 2022- negative  Group B Strep Culture   Date Value Ref Range Status   2022 No Group B Streptococcus isolated  Final         No Known Allergies     Her Delivery Plan is: IOL scheduled for 2022    US today: no    ROS -   Patient Reports : swelling, magy ramirez/contraction and pelvic pain  Patient Denies: Loss of Fluid, Vaginal Spotting, Vision Changes, Headaches, Nausea , Vomiting  and Epigastric pain  Fetal Movement : adequate  All other systems reviewed and are negative.       The additional following portions of the patient's history were reviewed and updated as appropriate: allergies, current medications, past family history, past medical history, past social history, past surgical history and problem list.    I have reviewed and agree with the HPI, ROS, and historical information as entered above. Lore Lai, THELMA      EXAM:     Prenatal Vitals  BP: 118/74  Weight: 78.6 kg (173 lb 3.2 oz)   Fetal Heart Rate: 145     Pelvic Exam: 2+Dilation  Dilation/Effacement/Station  Dilation: 2      Urine Glucose Read-only: Negative  Urine Protein Read-only: Negative       Assessment and Plan  1. IOL 2022  2. Magy Ramirez Contractions  Problem List Items Addressed This Visit        Gravid and      Pregnancy - Primary    Overview     Cell free DNA screen; low risk; male.   Prev  term 6#10oz  EFW 34 wks 55%ile         Relevant Orders    POC Protein, Urine, Qualitative, Dipstick (Completed)    POC Glucose, Urine, Qualitative, Dipstick (Completed)          1. Pregnancy at 38w1d  2. Fetal status reassuring.   3. Reviewed Pre-eclampsia signs/symptoms  4. Delivery options reviewed with patient  5. Signs of labor reviewed  6. Kick counts reviewed  7. Activity and Exercise discussed.  Return for 2022 Covid Testing, 2022 IOL.    Lore Lai, APRN  2022

## 2022-08-05 ENCOUNTER — CLINICAL SUPPORT NO REQUIREMENTS (OUTPATIENT)
Dept: PREADMISSION TESTING | Facility: HOSPITAL | Age: 27
End: 2022-08-05

## 2022-08-05 LAB — SARS-COV-2 RNA PNL SPEC NAA+PROBE: NOT DETECTED

## 2022-08-05 PROCEDURE — C9803 HOPD COVID-19 SPEC COLLECT: HCPCS

## 2022-08-05 PROCEDURE — U0004 COV-19 TEST NON-CDC HGH THRU: HCPCS

## 2022-08-08 ENCOUNTER — HOSPITAL ENCOUNTER (INPATIENT)
Facility: HOSPITAL | Age: 27
LOS: 3 days | Discharge: HOME | End: 2022-08-11
Attending: OBSTETRICS & GYNECOLOGY | Admitting: OBSTETRICS & GYNECOLOGY

## 2022-08-08 ENCOUNTER — HOSPITAL ENCOUNTER (OUTPATIENT)
Dept: LABOR AND DELIVERY | Facility: HOSPITAL | Age: 27
Discharge: HOME | End: 2022-08-08

## 2022-08-08 LAB
DEPRECATED RDW RBC AUTO: 43.9 FL (ref 37–54)
ERYTHROCYTE [DISTWIDTH] IN BLOOD BY AUTOMATED COUNT: 16.3 % (ref 12.3–15.4)
HCT VFR BLD AUTO: 29.1 % (ref 34–46.6)
HGB BLD-MCNC: 8.9 G/DL (ref 12–15.9)
MCH RBC QN AUTO: 22.7 PG (ref 26.6–33)
MCHC RBC AUTO-ENTMCNC: 30.6 G/DL (ref 31.5–35.7)
MCV RBC AUTO: 74.2 FL (ref 79–97)
PLATELET # BLD AUTO: 199 10*3/MM3 (ref 140–450)
PMV BLD AUTO: 11.5 FL (ref 6–12)
RBC # BLD AUTO: 3.92 10*6/MM3 (ref 3.77–5.28)
WBC NRBC COR # BLD: 10.1 10*3/MM3 (ref 3.4–10.8)

## 2022-08-08 PROCEDURE — 85027 COMPLETE CBC AUTOMATED: CPT | Performed by: OBSTETRICS & GYNECOLOGY

## 2022-08-08 PROCEDURE — 3E033VJ INTRODUCTION OF OTHER HORMONE INTO PERIPHERAL VEIN, PERCUTANEOUS APPROACH: ICD-10-PCS | Performed by: OBSTETRICS & GYNECOLOGY

## 2022-08-08 PROCEDURE — 25010000002 BUTORPHANOL PER 1 MG: Performed by: OBSTETRICS & GYNECOLOGY

## 2022-08-08 PROCEDURE — 59025 FETAL NON-STRESS TEST: CPT

## 2022-08-08 RX ORDER — OXYTOCIN/0.9 % SODIUM CHLORIDE 30/500 ML
650 PLASTIC BAG, INJECTION (ML) INTRAVENOUS ONCE
Status: DISCONTINUED | OUTPATIENT
Start: 2022-08-08 | End: 2022-08-09 | Stop reason: HOSPADM

## 2022-08-08 RX ORDER — MORPHINE SULFATE 2 MG/ML
2 INJECTION, SOLUTION INTRAMUSCULAR; INTRAVENOUS
Status: DISCONTINUED | OUTPATIENT
Start: 2022-08-08 | End: 2022-08-08 | Stop reason: SDUPTHER

## 2022-08-08 RX ORDER — SODIUM CHLORIDE 0.9 % (FLUSH) 0.9 %
10 SYRINGE (ML) INJECTION AS NEEDED
Status: DISCONTINUED | OUTPATIENT
Start: 2022-08-08 | End: 2022-08-09 | Stop reason: HOSPADM

## 2022-08-08 RX ORDER — ACETAMINOPHEN 325 MG/1
650 TABLET ORAL EVERY 4 HOURS PRN
Status: DISCONTINUED | OUTPATIENT
Start: 2022-08-08 | End: 2022-08-09 | Stop reason: HOSPADM

## 2022-08-08 RX ORDER — OXYTOCIN/0.9 % SODIUM CHLORIDE 30/500 ML
2-20 PLASTIC BAG, INJECTION (ML) INTRAVENOUS
Status: DISCONTINUED | OUTPATIENT
Start: 2022-08-09 | End: 2022-08-09 | Stop reason: HOSPADM

## 2022-08-08 RX ORDER — PROMETHAZINE HYDROCHLORIDE 12.5 MG/1
12.5 TABLET ORAL EVERY 6 HOURS PRN
Status: DISCONTINUED | OUTPATIENT
Start: 2022-08-08 | End: 2022-08-09 | Stop reason: HOSPADM

## 2022-08-08 RX ORDER — OXYTOCIN/0.9 % SODIUM CHLORIDE 30/500 ML
85 PLASTIC BAG, INJECTION (ML) INTRAVENOUS ONCE
Status: COMPLETED | OUTPATIENT
Start: 2022-08-08 | End: 2022-08-09

## 2022-08-08 RX ORDER — ONDANSETRON 2 MG/ML
4 INJECTION INTRAMUSCULAR; INTRAVENOUS EVERY 6 HOURS PRN
Status: DISCONTINUED | OUTPATIENT
Start: 2022-08-08 | End: 2022-08-09 | Stop reason: HOSPADM

## 2022-08-08 RX ORDER — ACETAMINOPHEN 325 MG/1
650 TABLET ORAL EVERY 4 HOURS PRN
Status: DISCONTINUED | OUTPATIENT
Start: 2022-08-08 | End: 2022-08-08 | Stop reason: SDUPTHER

## 2022-08-08 RX ORDER — PROMETHAZINE HYDROCHLORIDE 12.5 MG/1
12.5 SUPPOSITORY RECTAL EVERY 6 HOURS PRN
Status: DISCONTINUED | OUTPATIENT
Start: 2022-08-08 | End: 2022-08-09 | Stop reason: HOSPADM

## 2022-08-08 RX ORDER — SODIUM CHLORIDE, SODIUM LACTATE, POTASSIUM CHLORIDE, CALCIUM CHLORIDE 600; 310; 30; 20 MG/100ML; MG/100ML; MG/100ML; MG/100ML
125 INJECTION, SOLUTION INTRAVENOUS CONTINUOUS
Status: DISCONTINUED | OUTPATIENT
Start: 2022-08-08 | End: 2022-08-09

## 2022-08-08 RX ORDER — ONDANSETRON 4 MG/1
4 TABLET, FILM COATED ORAL EVERY 6 HOURS PRN
Status: DISCONTINUED | OUTPATIENT
Start: 2022-08-08 | End: 2022-08-09 | Stop reason: HOSPADM

## 2022-08-08 RX ORDER — SODIUM CHLORIDE 0.9 % (FLUSH) 0.9 %
10 SYRINGE (ML) INJECTION EVERY 12 HOURS SCHEDULED
Status: DISCONTINUED | OUTPATIENT
Start: 2022-08-08 | End: 2022-08-09 | Stop reason: HOSPADM

## 2022-08-08 RX ORDER — LIDOCAINE HYDROCHLORIDE 10 MG/ML
5 INJECTION, SOLUTION EPIDURAL; INFILTRATION; INTRACAUDAL; PERINEURAL AS NEEDED
Status: DISCONTINUED | OUTPATIENT
Start: 2022-08-08 | End: 2022-08-09 | Stop reason: HOSPADM

## 2022-08-08 RX ORDER — MORPHINE SULFATE 2 MG/ML
2 INJECTION, SOLUTION INTRAMUSCULAR; INTRAVENOUS
Status: DISCONTINUED | OUTPATIENT
Start: 2022-08-08 | End: 2022-08-09 | Stop reason: HOSPADM

## 2022-08-08 RX ORDER — PROMETHAZINE HYDROCHLORIDE 12.5 MG/1
12.5 TABLET ORAL EVERY 6 HOURS PRN
Status: DISCONTINUED | OUTPATIENT
Start: 2022-08-08 | End: 2022-08-08 | Stop reason: SDUPTHER

## 2022-08-08 RX ORDER — IBUPROFEN 600 MG/1
600 TABLET ORAL EVERY 6 HOURS PRN
Status: DISCONTINUED | OUTPATIENT
Start: 2022-08-08 | End: 2022-08-09 | Stop reason: HOSPADM

## 2022-08-08 RX ORDER — MAGNESIUM CARB/ALUMINUM HYDROX 105-160MG
30 TABLET,CHEWABLE ORAL ONCE
Status: DISCONTINUED | OUTPATIENT
Start: 2022-08-08 | End: 2022-08-09 | Stop reason: HOSPADM

## 2022-08-08 RX ADMIN — SODIUM CHLORIDE, POTASSIUM CHLORIDE, SODIUM LACTATE AND CALCIUM CHLORIDE 125 ML/HR: 600; 310; 30; 20 INJECTION, SOLUTION INTRAVENOUS at 22:05

## 2022-08-08 RX ADMIN — BUTORPHANOL TARTRATE 2 MG: 2 INJECTION, SOLUTION INTRAMUSCULAR; INTRAVENOUS at 23:33

## 2022-08-09 ENCOUNTER — ANESTHESIA EVENT (OUTPATIENT)
Dept: LABOR AND DELIVERY | Facility: HOSPITAL | Age: 27
End: 2022-08-09

## 2022-08-09 ENCOUNTER — ANESTHESIA (OUTPATIENT)
Dept: LABOR AND DELIVERY | Facility: HOSPITAL | Age: 27
End: 2022-08-09

## 2022-08-09 LAB
ABO GROUP BLD: NORMAL
BLD GP AB SCN SERPL QL: NEGATIVE
RH BLD: POSITIVE
T&S EXPIRATION DATE: NORMAL

## 2022-08-09 PROCEDURE — 86900 BLOOD TYPING SEROLOGIC ABO: CPT | Performed by: OBSTETRICS & GYNECOLOGY

## 2022-08-09 PROCEDURE — 25010000002 ROPIVACAINE PER 1 MG: Performed by: ANESTHESIOLOGY

## 2022-08-09 PROCEDURE — 10907ZC DRAINAGE OF AMNIOTIC FLUID, THERAPEUTIC FROM PRODUCTS OF CONCEPTION, VIA NATURAL OR ARTIFICIAL OPENING: ICD-10-PCS | Performed by: OBSTETRICS & GYNECOLOGY

## 2022-08-09 PROCEDURE — 25010000002 FENTANYL CITRATE (PF) 50 MCG/ML SOLUTION: Performed by: ANESTHESIOLOGY

## 2022-08-09 PROCEDURE — 59025 FETAL NON-STRESS TEST: CPT

## 2022-08-09 PROCEDURE — 25010000002 METOCLOPRAMIDE PER 10 MG: Performed by: ANESTHESIOLOGY

## 2022-08-09 PROCEDURE — 86850 RBC ANTIBODY SCREEN: CPT | Performed by: OBSTETRICS & GYNECOLOGY

## 2022-08-09 PROCEDURE — 25010000002 ONDANSETRON PER 1 MG: Performed by: OBSTETRICS & GYNECOLOGY

## 2022-08-09 PROCEDURE — C1755 CATHETER, INTRASPINAL: HCPCS | Performed by: ANESTHESIOLOGY

## 2022-08-09 PROCEDURE — 51703 INSERT BLADDER CATH COMPLEX: CPT

## 2022-08-09 PROCEDURE — 59400 OBSTETRICAL CARE: CPT | Performed by: OBSTETRICS & GYNECOLOGY

## 2022-08-09 PROCEDURE — 25010000002 ONDANSETRON PER 1 MG: Performed by: ANESTHESIOLOGY

## 2022-08-09 PROCEDURE — 86901 BLOOD TYPING SEROLOGIC RH(D): CPT | Performed by: OBSTETRICS & GYNECOLOGY

## 2022-08-09 PROCEDURE — 25010000002 BUTORPHANOL PER 1 MG: Performed by: OBSTETRICS & GYNECOLOGY

## 2022-08-09 PROCEDURE — C1755 CATHETER, INTRASPINAL: HCPCS

## 2022-08-09 PROCEDURE — 51702 INSERT TEMP BLADDER CATH: CPT

## 2022-08-09 PROCEDURE — 0KQM0ZZ REPAIR PERINEUM MUSCLE, OPEN APPROACH: ICD-10-PCS | Performed by: OBSTETRICS & GYNECOLOGY

## 2022-08-09 RX ORDER — BISACODYL 10 MG
10 SUPPOSITORY, RECTAL RECTAL DAILY PRN
Status: DISCONTINUED | OUTPATIENT
Start: 2022-08-10 | End: 2022-08-11 | Stop reason: HOSPADM

## 2022-08-09 RX ORDER — FAMOTIDINE 10 MG/ML
20 INJECTION, SOLUTION INTRAVENOUS ONCE AS NEEDED
Status: COMPLETED | OUTPATIENT
Start: 2022-08-09 | End: 2022-08-09

## 2022-08-09 RX ORDER — METOCLOPRAMIDE HYDROCHLORIDE 5 MG/ML
10 INJECTION INTRAMUSCULAR; INTRAVENOUS ONCE AS NEEDED
Status: COMPLETED | OUTPATIENT
Start: 2022-08-09 | End: 2022-08-09

## 2022-08-09 RX ORDER — ONDANSETRON 2 MG/ML
4 INJECTION INTRAMUSCULAR; INTRAVENOUS EVERY 6 HOURS PRN
Status: DISCONTINUED | OUTPATIENT
Start: 2022-08-09 | End: 2022-08-11 | Stop reason: HOSPADM

## 2022-08-09 RX ORDER — ROPIVACAINE HYDROCHLORIDE 5 MG/ML
INJECTION, SOLUTION EPIDURAL; INFILTRATION; PERINEURAL AS NEEDED
Status: DISCONTINUED | OUTPATIENT
Start: 2022-08-09 | End: 2022-08-09 | Stop reason: SURG

## 2022-08-09 RX ORDER — EPHEDRINE SULFATE 5 MG/ML
10 INJECTION INTRAVENOUS
Status: DISCONTINUED | OUTPATIENT
Start: 2022-08-09 | End: 2022-08-09 | Stop reason: HOSPADM

## 2022-08-09 RX ORDER — ONDANSETRON 2 MG/ML
4 INJECTION INTRAMUSCULAR; INTRAVENOUS ONCE AS NEEDED
Status: COMPLETED | OUTPATIENT
Start: 2022-08-09 | End: 2022-08-09

## 2022-08-09 RX ORDER — MISOPROSTOL 200 UG/1
800 TABLET ORAL AS NEEDED
Status: DISCONTINUED | OUTPATIENT
Start: 2022-08-09 | End: 2022-08-11 | Stop reason: HOSPADM

## 2022-08-09 RX ORDER — PROMETHAZINE HYDROCHLORIDE 12.5 MG/1
12.5 TABLET ORAL EVERY 4 HOURS PRN
Status: DISCONTINUED | OUTPATIENT
Start: 2022-08-09 | End: 2022-08-11 | Stop reason: HOSPADM

## 2022-08-09 RX ORDER — HYDROCORTISONE 25 MG/G
1 CREAM TOPICAL AS NEEDED
Status: DISCONTINUED | OUTPATIENT
Start: 2022-08-09 | End: 2022-08-11 | Stop reason: HOSPADM

## 2022-08-09 RX ORDER — DOCUSATE SODIUM 100 MG/1
100 CAPSULE, LIQUID FILLED ORAL 2 TIMES DAILY
Status: DISCONTINUED | OUTPATIENT
Start: 2022-08-09 | End: 2022-08-11 | Stop reason: HOSPADM

## 2022-08-09 RX ORDER — SODIUM CHLORIDE 0.9 % (FLUSH) 0.9 %
1-10 SYRINGE (ML) INJECTION AS NEEDED
Status: DISCONTINUED | OUTPATIENT
Start: 2022-08-09 | End: 2022-08-11 | Stop reason: HOSPADM

## 2022-08-09 RX ORDER — TRISODIUM CITRATE DIHYDRATE AND CITRIC ACID MONOHYDRATE 500; 334 MG/5ML; MG/5ML
30 SOLUTION ORAL ONCE
Status: DISCONTINUED | OUTPATIENT
Start: 2022-08-09 | End: 2022-08-09 | Stop reason: HOSPADM

## 2022-08-09 RX ORDER — FENTANYL CITRATE 50 UG/ML
INJECTION, SOLUTION INTRAMUSCULAR; INTRAVENOUS AS NEEDED
Status: DISCONTINUED | OUTPATIENT
Start: 2022-08-09 | End: 2022-08-09 | Stop reason: SURG

## 2022-08-09 RX ORDER — DIPHENHYDRAMINE HYDROCHLORIDE 50 MG/ML
12.5 INJECTION INTRAMUSCULAR; INTRAVENOUS EVERY 8 HOURS PRN
Status: DISCONTINUED | OUTPATIENT
Start: 2022-08-09 | End: 2022-08-09 | Stop reason: HOSPADM

## 2022-08-09 RX ORDER — HYDROCODONE BITARTRATE AND ACETAMINOPHEN 5; 325 MG/1; MG/1
1 TABLET ORAL EVERY 4 HOURS PRN
Status: DISCONTINUED | OUTPATIENT
Start: 2022-08-09 | End: 2022-08-11 | Stop reason: HOSPADM

## 2022-08-09 RX ORDER — OXYCODONE HYDROCHLORIDE AND ACETAMINOPHEN 5; 325 MG/1; MG/1
1 TABLET ORAL EVERY 4 HOURS PRN
Status: DISCONTINUED | OUTPATIENT
Start: 2022-08-09 | End: 2022-08-11 | Stop reason: HOSPADM

## 2022-08-09 RX ORDER — ROPIVACAINE HYDROCHLORIDE 2 MG/ML
15 INJECTION, SOLUTION EPIDURAL; INFILTRATION; PERINEURAL CONTINUOUS
Status: DISCONTINUED | OUTPATIENT
Start: 2022-08-09 | End: 2022-08-09

## 2022-08-09 RX ORDER — LIDOCAINE HYDROCHLORIDE AND EPINEPHRINE 15; 5 MG/ML; UG/ML
INJECTION, SOLUTION EPIDURAL AS NEEDED
Status: DISCONTINUED | OUTPATIENT
Start: 2022-08-09 | End: 2022-08-09 | Stop reason: SURG

## 2022-08-09 RX ORDER — ONDANSETRON 4 MG/1
4 TABLET, FILM COATED ORAL EVERY 6 HOURS PRN
Status: DISCONTINUED | OUTPATIENT
Start: 2022-08-09 | End: 2022-08-11 | Stop reason: HOSPADM

## 2022-08-09 RX ORDER — IBUPROFEN 600 MG/1
600 TABLET ORAL EVERY 6 HOURS PRN
Status: DISCONTINUED | OUTPATIENT
Start: 2022-08-09 | End: 2022-08-11 | Stop reason: HOSPADM

## 2022-08-09 RX ADMIN — BENZOCAINE 1 APPLICATION: 5.6 OINTMENT TOPICAL at 16:55

## 2022-08-09 RX ADMIN — SODIUM CHLORIDE, POTASSIUM CHLORIDE, SODIUM LACTATE AND CALCIUM CHLORIDE 999 ML/HR: 600; 310; 30; 20 INJECTION, SOLUTION INTRAVENOUS at 03:25

## 2022-08-09 RX ADMIN — ONDANSETRON 4 MG: 2 INJECTION INTRAMUSCULAR; INTRAVENOUS at 04:04

## 2022-08-09 RX ADMIN — Medication 1 APPLICATION: at 16:55

## 2022-08-09 RX ADMIN — LIDOCAINE HYDROCHLORIDE AND EPINEPHRINE 3 ML: 15; 5 INJECTION, SOLUTION EPIDURAL at 03:35

## 2022-08-09 RX ADMIN — EPHEDRINE SULFATE 10 MG: 5 INJECTION INTRAVENOUS at 09:04

## 2022-08-09 RX ADMIN — ROPIVACAINE HYDROCHLORIDE 15 ML/HR: 2 INJECTION, SOLUTION EPIDURAL; INFILTRATION at 10:15

## 2022-08-09 RX ADMIN — EPHEDRINE SULFATE 10 MG: 5 INJECTION INTRAVENOUS at 07:52

## 2022-08-09 RX ADMIN — SODIUM CHLORIDE, POTASSIUM CHLORIDE, SODIUM LACTATE AND CALCIUM CHLORIDE 125 ML/HR: 600; 310; 30; 20 INJECTION, SOLUTION INTRAVENOUS at 03:42

## 2022-08-09 RX ADMIN — FAMOTIDINE 20 MG: 10 INJECTION, SOLUTION INTRAVENOUS at 03:54

## 2022-08-09 RX ADMIN — BUTORPHANOL TARTRATE 2 MG: 2 INJECTION, SOLUTION INTRAMUSCULAR; INTRAVENOUS at 02:39

## 2022-08-09 RX ADMIN — METOCLOPRAMIDE 10 MG: 5 INJECTION, SOLUTION INTRAMUSCULAR; INTRAVENOUS at 07:46

## 2022-08-09 RX ADMIN — SODIUM CHLORIDE, POTASSIUM CHLORIDE, SODIUM LACTATE AND CALCIUM CHLORIDE 125 ML/HR: 600; 310; 30; 20 INJECTION, SOLUTION INTRAVENOUS at 10:40

## 2022-08-09 RX ADMIN — Medication 2 MILLI-UNITS/MIN: at 00:32

## 2022-08-09 RX ADMIN — ROPIVACAINE HYDROCHLORIDE 6 ML: 5 INJECTION, SOLUTION EPIDURAL; INFILTRATION; PERINEURAL at 03:40

## 2022-08-09 RX ADMIN — Medication 85 ML/HR: at 14:30

## 2022-08-09 RX ADMIN — Medication: at 16:55

## 2022-08-09 RX ADMIN — IBUPROFEN 600 MG: 600 TABLET ORAL at 14:51

## 2022-08-09 RX ADMIN — WITCH HAZEL 1 PAD: 500 SOLUTION RECTAL; TOPICAL at 16:55

## 2022-08-09 RX ADMIN — LIDOCAINE HYDROCHLORIDE AND EPINEPHRINE 2 ML: 15; 5 INJECTION, SOLUTION EPIDURAL at 03:37

## 2022-08-09 RX ADMIN — IBUPROFEN 600 MG: 600 TABLET ORAL at 22:13

## 2022-08-09 RX ADMIN — ONDANSETRON 4 MG: 2 INJECTION INTRAMUSCULAR; INTRAVENOUS at 09:00

## 2022-08-09 RX ADMIN — DOCUSATE SODIUM 100 MG: 100 CAPSULE, LIQUID FILLED ORAL at 20:27

## 2022-08-09 RX ADMIN — ROPIVACAINE HYDROCHLORIDE 15 ML/HR: 2 INJECTION, SOLUTION EPIDURAL; INFILTRATION at 03:43

## 2022-08-09 RX ADMIN — FENTANYL CITRATE 100 MCG: 50 INJECTION, SOLUTION INTRAMUSCULAR; INTRAVENOUS at 03:40

## 2022-08-09 RX ADMIN — HYDROCORTISONE 2.5% 1 APPLICATION: 25 CREAM TOPICAL at 16:55

## 2022-08-09 NOTE — L&D DELIVERY NOTE
2022    Patient:Brittanie Bentley    MR#:[id number]    Vaginal Delivery Note  26 y.o. yo female  at 39w2d    Patient Active Problem List   Diagnosis   • Pelvic pain   • Pregnancy   • False labor before 37 completed weeks of gestation in third trimester       Delivery     Delivery: Vaginal, Spontaneous     YOB: 2022    Time of Birth: 1:16 PM      Anesthesia: Epidural     Delivering clinician:     Forceps?   No   Vacuum? No    Shoulder dystocia present: No     Pt progressed to complete, pushed a few times and delivered without difficulty. Baby vigorous and crying.      Infant    Findings: male  infant     Infant observations: Weight: 3885 g (8 lb 9 oz)     Observations/Comments:        Apgars: 8  @ 1 minute /    9  @ 5 minutes         Placenta, Cord, and Fluid    Placenta delivered  Expressed  at  2022  1:20 PM     Cord:   present.   Nuchal Cord?  no   Cord blood obtained:     Cord gases obtained:      Cord gas results: Pending         Repair    Episiotomy: No   Lacerations: Yes  Laceration Information  Laceration Repaired?   Perineal:  2nd  yes   Periurethral:       Labial:       Sulcus:       Vaginal:       Cervical:         Suture used for repair: 2-0 Vicryl   Estimated Blood Loss:  200 mls.         Complications  none    Disposition  Mother to Mother Baby/Postpartum  in stable condition currently.  Baby to remains with mom  in stable condition currently.                Greta Mccann MD  22  13:35 EDT

## 2022-08-09 NOTE — PLAN OF CARE
Problem: Adult Inpatient Plan of Care  Goal: Plan of Care Review  8/9/2022 0552 by Zoey Rm RN  Outcome: Ongoing, Progressing  Flowsheets (Taken 8/9/2022 0552)  Plan of Care Reviewed With: patient  8/9/2022 0551 by Zoey Rm RN  Outcome: Ongoing, Progressing  Flowsheets (Taken 8/9/2022 0551)  Plan of Care Reviewed With: patient  Goal: Patient-Specific Goal (Individualized)  8/9/2022 0552 by Zoey Rm RN  Outcome: Ongoing, Progressing  8/9/2022 0551 by Zoey Rm RN  Outcome: Ongoing, Progressing  Goal: Absence of Hospital-Acquired Illness or Injury  8/9/2022 0552 by Zoey Rm RN  Outcome: Ongoing, Progressing  8/9/2022 0551 by Zoey Rm RN  Outcome: Ongoing, Progressing  Intervention: Identify and Manage Fall Risk  Description: Perform standard risk assessment on admission using a validated tool or comprehensive approach appropriate to the patient; reassess fall risk frequently, with change in status or transfer to another level of care.  Communicate fall injury risk to interprofessional healthcare team.  Determine need for increased observation, equipment and environmental modification, such as low bed, signage and supportive, nonskid footwear.  Adjust safety measures to individual developmental age, stage and identified risk factors.  Reinforce the importance of safety and physical activity with patient and family.  Perform regular intentional rounding to assess need for position change, pain assessment and personal needs, including assistance with toileting.  Recent Flowsheet Documentation  Taken 8/8/2022 2053 by Zoey Rm RN  Safety Promotion/Fall Prevention: safety round/check completed  Intervention: Prevent and Manage VTE (Venous Thromboembolism) Risk  Description: Assess for VTE (venous thromboembolism) risk.  Encourage and assist with early ambulation.  Initiate and maintain compression or other therapy, as indicated, based on identified risk in  accordance with organizational protocol and provider order.  Encourage both active and passive leg exercises while in bed, if unable to ambulate.  Recent Flowsheet Documentation  Taken 8/8/2022 2053 by Zoey Rm RN  Activity Management: activity adjusted per tolerance  Intervention: Prevent Infection  Description: Maintain skin and mucous membrane integrity; promote hand, oral and pulmonary hygiene.  Optimize fluid balance, nutrition, sleep and glycemic control to maximize infection resistance.  Identify potential sources of infection early to prevent or mitigate progression of infection (e.g., wound, lines, devices).  Evaluate ongoing need for invasive devices; remove promptly when no longer indicated.  Recent Flowsheet Documentation  Taken 8/8/2022 2053 by Zoey Rm RN  Infection Prevention: hand hygiene promoted  Goal: Optimal Comfort and Wellbeing  8/9/2022 0552 by Zoey Rm RN  Outcome: Ongoing, Progressing  8/9/2022 0551 by Zoey Rm RN  Outcome: Ongoing, Progressing  Intervention: Monitor Pain and Promote Comfort  Description: Assess pain level, treatment efficacy and patient response at regular intervals using a consistent pain scale.  Consider the presence and impact of preexisting chronic pain.  Encourage patient and caregiver involvement in pain assessment, interventions and safety measures.  Recent Flowsheet Documentation  Taken 8/9/2022 0235 by Zoey Rm RN  Pain Management Interventions: see MAR  Taken 8/8/2022 2320 by Zoey Rm RN  Pain Management Interventions: see MAR  Intervention: Provide Person-Centered Care  Description: Use a family-focused approach to care.  Develop trust and rapport by proactively providing information, encouraging questions, addressing concerns and offering reassurance.  Acknowledge emotional response to hospitalization.  Recognize and utilize personal coping strategies.  Honor spiritual and cultural preferences.  Recent  Flowsheet Documentation  Taken 8/8/2022 2053 by Zoey Rm, RN  Trust Relationship/Rapport:   care explained   choices provided   emotional support provided   empathic listening provided   questions answered   questions encouraged   reassurance provided   thoughts/feelings acknowledged  Goal: Readiness for Transition of Care  8/9/2022 0552 by Zoey Rm, RN  Outcome: Ongoing, Progressing  8/9/2022 0551 by Zoey Rm, RN  Outcome: Ongoing, Progressing   Goal Outcome Evaluation:  Plan of Care Reviewed With: patient

## 2022-08-09 NOTE — LACTATION NOTE
08/09/22 1601   Maternal Information   Person Making Referral lactation consultant   Maternal Reason for Referral   ( 1st baby for ~6 month, then milk dried up)   Infant Reason for Referral   (baby not in room;  mom attempted to breastfeed in L&D--baby cuing but didn't latch)   Milk Expression/Equipment   Breast Pump Type double electric, personal  (has personal Spectra S2 pump at home; also has an old Medela breast pump)   Breast Pumping   Breast Pumping Interventions   (can pump for missed feedings, if baby doesn't breastfeed well tonight.  Discussed pumping after feedings for the next 2-3 weeks to try to increase milk supply.)   Teaching done as documented under Education. Encouraged as much skin to skin as possible. To call lactation services, if there are questions or concerns or if mom wants help with a feeding tomorrow.

## 2022-08-09 NOTE — PLAN OF CARE
Problem: Breastfeeding  Goal: Effective Breastfeeding  Outcome: Ongoing, Progressing  Intervention: Support Exclusive Breastfeeding Success  Flowsheets (Taken 8/9/2022 1605)  Supportive Measures: active listening utilized  Breastfeeding Support:   diary/feeding log utilized   encouragement provided   lactation counseling provided  Intervention: Promote Effective Breastfeeding  Flowsheets (Taken 8/9/2022 1605)  Breastfeeding Assistance:   feeding cue recognition promoted   feeding on demand promoted   support offered  Parent/Child Attachment Promotion:   caring behavior modeled   cue recognition promoted   participation in care promoted   skin-to-skin contact encouraged   strengths emphasized   positive reinforcement provided     Problem: Pain Acute  Goal: Acceptable Pain Control and Functional Ability  Intervention: Optimize Psychosocial Wellbeing  Recent Flowsheet Documentation  Taken 8/9/2022 1605 by Rosas Barroso APRN  Supportive Measures: active listening utilized   Goal Outcome Evaluation:

## 2022-08-09 NOTE — ANESTHESIA PROCEDURE NOTES
Labor Epidural      Patient reassessed immediately prior to procedure    Patient location during procedure: OB  Performed By  Anesthesiologist: Elin Whittington DO  Preanesthetic Checklist  Completed: patient identified, IV checked, risks and benefits discussed, surgical consent, monitors and equipment checked, pre-op evaluation and timeout performed  Additional Notes  CSE performed using 25g Sadia  Prep:  Pt Position:sitting  Sterile Tech:cap, gloves, mask and sterile barrier  Prep:DuraPrep  Monitoring:blood pressure monitoring  Epidural Block Procedure:  Approach:midline  Guidance:palpation technique  Location:L3-L4  Needle Type:Tuohy  Needle Gauge:17 G  Loss of Resistance Medium: air  Loss of Resistance: 5cm  Cath Depth at skin:12 cm  Paresthesia: none  Aspiration:negative  Test Dose:negative  Number of Attempts: 1  Post Assessment:  Dressing:occlusive dressing applied and secured with tape  Pt Tolerance:patient tolerated the procedure well with no apparent complications  Complications:no

## 2022-08-09 NOTE — H&P
History and Physical:    Subjective     Chief Complaint   Patient presents with   • Scheduled Induction       Brittanie Bentley is a 26 y.o. year old  with an Estimated Date of Delivery: 22 currently at 39w2d presenting with no complaints. fopr her scheduled induction.     Prenatal care has been with Greta Mccann MD.        Review of Systems  Pertinent items are noted in HPI.     Past Medical History:   Diagnosis Date   • Bipolar 2 disorder (HCC) Spring 2020   • History of abnormal cervical Papanicolaou smear    • Migraine      Past Surgical History:   Procedure Laterality Date   • LAPAROSCOPIC CHOLECYSTECTOMY      age 16    • ORIF ELBOW FRACTURE Left     age 8   • PERCUTANEOUS PINNING ELBOW FRACTURE     • WISDOM TOOTH EXTRACTION       Family History   Problem Relation Age of Onset   • Uterine cancer Mother    • Graves' disease Mother    • Prostate cancer Maternal Grandfather    • Colon cancer Neg Hx    • Ovarian cancer Neg Hx    • Breast cancer Neg Hx      Social History     Tobacco Use   • Smoking status: Never Smoker   • Smokeless tobacco: Never Used   Vaping Use   • Vaping Use: Never used   Substance Use Topics   • Alcohol use: Not Currently     Comment: prior to pregnancy, 0-2x weekly - weekends   • Drug use: Never     Medications Prior to Admission   Medication Sig Dispense Refill Last Dose   • famotidine (PEPCID) 20 MG tablet Take 20 mg by mouth 2 (Two) Times a Day As Needed for Heartburn.   2022 at Unknown time   • Prenatal Vit-Fe Fumarate-FA (PRENATAL 27-) 27-1 MG tablet tablet Take 1 tablet by mouth Daily.   2022 at Unknown time     Allergies:  Patient has no known allergies.  OB History    Para Term  AB Living   3 1 1 0 1 1   SAB IAB Ectopic Molar Multiple Live Births   1 0 0 0 0 1      # Outcome Date GA Lbr Porfirio/2nd Weight Sex Delivery Anes PTL Lv   3 Current            2 Term 10/11/16 39w1d 30:56 / 00:55 2995 g (6 lb 9.6 oz) F Vag-Spont EPI N JANNIE   1 SAB  12/2015                 Objective     BP 96/57   Pulse 86   Temp 98.1 °F (36.7 °C) (Oral)   Resp 16   LMP 11/07/2021     Physical Exam    General:  No acute distress           Abdomen: Gravid, nontender       FHT's: reactive    Cervix: 2/50   Slana: Contraction are irreg     Lab Review   External Prenatal Results     Pregnancy Outside Results - Transcribed From Office Records - See Scanned Records For Details     Test Value Date Time    ABO  A  08/09/22 0028    Rh  Positive  08/09/22 0028    Antibody Screen  Negative  08/09/22 0028       Negative  07/20/22 1219       Negative  05/20/22 1236       Negative  12/20/21 1405    Varicella IgG       Rubella  <0.90 index 12/20/21 1405    Hgb  8.9 g/dL 08/08/22 2203       9.4 g/dL 07/20/22 1219       10.6 g/dL 05/20/22 1236       12.8 g/dL 02/02/22 1132       13.1 g/dL 12/31/21 1109       12.6 g/dL 12/20/21 1405    Hct  29.1 % 08/08/22 2203       30.2 % 07/20/22 1219       31.5 % 05/20/22 1236       39.1 % 02/02/22 1132       38.8 % 12/31/21 1109       37.8 % 12/20/21 1405    Glucose Fasting GTT       Glucose Tolerance Test 1 hour ^ passed 123  08/12/16     Glucose Tolerance Test 3 hour       Gonorrhea (discrete)  Negative  12/20/21 1405    Chlamydia (discrete)  Negative  12/20/21 1405    RPR  Non Reactive  12/20/21 1405    VDRL       Syphilis Antibody       HBsAg  Negative  12/20/21 1405    Herpes Simplex Virus PCR       Herpes Simplex VIrus Culture       HIV  Non Reactive  12/20/21 1405    Hep C RNA Quant PCR       Hep C Antibody  <0.1 s/co ratio 12/20/21 1405    AFP       Group B Strep  No Group B Streptococcus isolated  07/21/22 1717    GBS Susceptibility to Clindamycin       GBS Susceptibility to Erythromycin       Fetal Fibronectin       Genetic Testing, Maternal Blood             Drug Screening     Test Value Date Time    Urine Drug Screen ^ negative  03/09/16     Amphetamine Screen  Negative ng/mL 12/20/21 1405    Barbiturate Screen  Negative ng/mL 12/20/21 1405     Benzodiazepine Screen  Negative ng/mL 12/20/21 1405    Methadone Screen  Negative ng/mL 12/20/21 1405    Phencyclidine Screen  Negative ng/mL 12/20/21 1405    Opiates Screen       THC Screen       Cocaine Screen       Propoxyphene Screen  Negative ng/mL 12/20/21 1405    Buprenorphine Screen       Methamphetamine Screen       Oxycodone Screen       Tricyclic Antidepressants Screen             Legend    ^: Historical                            Assessment & Plan     ASSESSMENT  1. IUP at 39w2d  2. induction of labor   3. GBBSnegative  PLAN  1. Admit to labor and delivery   2.  pitocin and jernigan bulb         Greta Mccann MD  8/9/2022@

## 2022-08-09 NOTE — ANESTHESIA PREPROCEDURE EVALUATION
Anesthesia Evaluation     Patient summary reviewed and Nursing notes reviewed   NPO Solid Status: > 6 hours             Airway   Mallampati: II  TM distance: >3 FB  Neck ROM: full  No difficulty expected  Dental      Pulmonary - negative pulmonary ROS   Cardiovascular - negative cardio ROS        Neuro/Psych  (+) headaches, psychiatric history Anxiety,    GI/Hepatic/Renal/Endo - negative ROS     Musculoskeletal (-) negative ROS    Abdominal    Substance History - negative use     OB/GYN    (+) Pregnant,         Other - negative ROS                       Anesthesia Plan    ASA 2     epidural       Anesthetic plan, risks, benefits, and alternatives have been provided, discussed and informed consent has been obtained with: patient.        CODE STATUS:

## 2022-08-10 LAB
BASOPHILS # BLD AUTO: 0.02 10*3/MM3 (ref 0–0.2)
BASOPHILS NFR BLD AUTO: 0.2 % (ref 0–1.5)
DEPRECATED RDW RBC AUTO: 43.8 FL (ref 37–54)
EOSINOPHIL # BLD AUTO: 0.04 10*3/MM3 (ref 0–0.4)
EOSINOPHIL NFR BLD AUTO: 0.3 % (ref 0.3–6.2)
ERYTHROCYTE [DISTWIDTH] IN BLOOD BY AUTOMATED COUNT: 16.5 % (ref 12.3–15.4)
HCT VFR BLD AUTO: 22.8 % (ref 34–46.6)
HCT VFR BLD AUTO: 24.6 % (ref 34–46.6)
HGB BLD-MCNC: 7 G/DL (ref 12–15.9)
HGB BLD-MCNC: 7.3 G/DL (ref 12–15.9)
IMM GRANULOCYTES # BLD AUTO: 0.08 10*3/MM3 (ref 0–0.05)
IMM GRANULOCYTES NFR BLD AUTO: 0.6 % (ref 0–0.5)
LYMPHOCYTES # BLD AUTO: 1.7 10*3/MM3 (ref 0.7–3.1)
LYMPHOCYTES NFR BLD AUTO: 13.7 % (ref 19.6–45.3)
MCH RBC QN AUTO: 22.7 PG (ref 26.6–33)
MCHC RBC AUTO-ENTMCNC: 30.7 G/DL (ref 31.5–35.7)
MCV RBC AUTO: 73.8 FL (ref 79–97)
MONOCYTES # BLD AUTO: 1.21 10*3/MM3 (ref 0.1–0.9)
MONOCYTES NFR BLD AUTO: 9.8 % (ref 5–12)
NEUTROPHILS NFR BLD AUTO: 75.4 % (ref 42.7–76)
NEUTROPHILS NFR BLD AUTO: 9.32 10*3/MM3 (ref 1.7–7)
NRBC BLD AUTO-RTO: 0 /100 WBC (ref 0–0.2)
PLATELET # BLD AUTO: 129 10*3/MM3 (ref 140–450)
PMV BLD AUTO: 12 FL (ref 6–12)
RBC # BLD AUTO: 3.09 10*6/MM3 (ref 3.77–5.28)
WBC NRBC COR # BLD: 12.37 10*3/MM3 (ref 3.4–10.8)

## 2022-08-10 PROCEDURE — 85025 COMPLETE CBC W/AUTO DIFF WBC: CPT | Performed by: OBSTETRICS & GYNECOLOGY

## 2022-08-10 PROCEDURE — 85018 HEMOGLOBIN: CPT

## 2022-08-10 PROCEDURE — 85014 HEMATOCRIT: CPT

## 2022-08-10 PROCEDURE — 0503F POSTPARTUM CARE VISIT: CPT

## 2022-08-10 RX ORDER — FERROUS SULFATE 325(65) MG
325 TABLET ORAL 2 TIMES DAILY WITH MEALS
Status: DISCONTINUED | OUTPATIENT
Start: 2022-08-10 | End: 2022-08-11 | Stop reason: HOSPADM

## 2022-08-10 RX ADMIN — FERROUS SULFATE TAB 325 MG (65 MG ELEMENTAL FE) 325 MG: 325 (65 FE) TAB at 08:58

## 2022-08-10 RX ADMIN — IBUPROFEN 600 MG: 600 TABLET ORAL at 08:58

## 2022-08-10 RX ADMIN — IBUPROFEN 600 MG: 600 TABLET ORAL at 21:22

## 2022-08-10 RX ADMIN — FERROUS SULFATE TAB 325 MG (65 MG ELEMENTAL FE) 325 MG: 325 (65 FE) TAB at 17:33

## 2022-08-10 RX ADMIN — DOCUSATE SODIUM 100 MG: 100 CAPSULE, LIQUID FILLED ORAL at 08:58

## 2022-08-10 RX ADMIN — IBUPROFEN 600 MG: 600 TABLET ORAL at 03:37

## 2022-08-10 RX ADMIN — IBUPROFEN 600 MG: 600 TABLET ORAL at 15:23

## 2022-08-10 RX ADMIN — DOCUSATE SODIUM 100 MG: 100 CAPSULE, LIQUID FILLED ORAL at 20:19

## 2022-08-10 RX ADMIN — HYDROCODONE BITARTRATE AND ACETAMINOPHEN 1 TABLET: 5; 325 TABLET ORAL at 21:22

## 2022-08-10 NOTE — LACTATION NOTE
08/10/22 1045   Maternal Information   Person Making Referral nurse   Infant Reason for Referral   (baby sleepy and not latching; close to 16 hours old. Has put to breast several times but then won't suck.)   Milk Expression/Equipment   Breast Pump Type double electric, hospital grade;double electric, personal  (nursing staff initiated pumping with hospital pump and has personal pump at home)   Breast Pump Flange Type hard   Breast Pump Flange Size 24 mm   Breast Pumping   Breast Pumping Interventions post-feed pumping encouraged  (pump for missed feedings and feed baby any pumped milk)   Demonstrated finger suck training and pacifier use--to have baby sucking as much as possible this afternoon. Also, encouraged as much skin to skin as possible. Nursing staff put in consult to speech and they will come and evaluate feeding at 1 pm feeding. To call lactation services, if there are questions or concerns or if mom wants help with a feeding. Teaching done as documented under Educaiton.

## 2022-08-10 NOTE — ANESTHESIA POSTPROCEDURE EVALUATION
Patient: Brittanie Bentley    Procedure Summary     Date: 08/09/22 Room / Location:     Anesthesia Start: 0325 Anesthesia Stop: 1320    Procedure: LABOR ANALGESIA Diagnosis:     Scheduled Providers:  Provider: Elin Whittington DO    Anesthesia Type: epidural ASA Status: 2          Anesthesia Type: epidural    Vitals  Vitals Value Taken Time   /62 08/10/22 0905   Temp 98 °F (36.7 °C) 08/10/22 0905   Pulse 74 08/10/22 0905   Resp 18 08/10/22 0905   SpO2             Post Anesthesia Care and Evaluation    Patient location during evaluation: bedside  Patient participation: complete - patient participated  Level of consciousness: awake and alert  Pain management: adequate    Airway patency: patent  Anesthetic complications: No anesthetic complications    Cardiovascular status: acceptable  Respiratory status: acceptable  Hydration status: acceptable  Post Neuraxial Block status: Motor and sensory function returned to baseline and No signs or symptoms of PDPH

## 2022-08-10 NOTE — PROGRESS NOTES
8/10/2022  PPD #1    Subjective   Brittanie feels tired.  Patient describes her lochia less than menses.  Pain is well controlled    Objective   Temp: Temp:  [98 °F (36.7 °C)-98.4 °F (36.9 °C)] 98 °F (36.7 °C) Temp src: Oral   BP: BP: ()/(50-76) 114/62        Pulse: Heart Rate:  [64-94] 74  RR: Resp:  [16-18] 18    General:  No acute distress   Abdomen: Fundus firm and beneath umbilicus   Pelvis: deferred     Lab Results   Component Value Date    WBC 12.37 (H) 08/10/2022    HGB 7.0 (L) 08/10/2022    HCT 22.8 (L) 08/10/2022    MCV 73.8 (L) 08/10/2022     (L) 08/10/2022    ABORH A Rh Positive 02/28/2016    HEPBSAG Negative 12/20/2021     Infant: Male. Doing well. Desires circ.     Assessment  1. PPD# 1 after vaginal delivery. Doing fine.   2. Anemic. Pale skin. H/H 7/22.8  3. Vitals stable  4. Up using restroom. Reports feeling fatigued  5. Denies: dizziness, confusion    Plan  1. Routine postpartum care  2. Iron BID  3. Colace BID  4. Increase water intake 2-3L/day  5. Repeat H/H; 2pm today and in AM      This note has been electronically signed.    Lore Lai, APRN  August 10, 2022

## 2022-08-11 VITALS
HEART RATE: 80 BPM | SYSTOLIC BLOOD PRESSURE: 118 MMHG | DIASTOLIC BLOOD PRESSURE: 69 MMHG | TEMPERATURE: 97.8 F | RESPIRATION RATE: 18 BRPM

## 2022-08-11 LAB
HCT VFR BLD AUTO: 25.1 % (ref 34–46.6)
HGB BLD-MCNC: 7.4 G/DL (ref 12–15.9)

## 2022-08-11 PROCEDURE — 0503F POSTPARTUM CARE VISIT: CPT | Performed by: OBSTETRICS & GYNECOLOGY

## 2022-08-11 PROCEDURE — 85018 HEMOGLOBIN: CPT

## 2022-08-11 PROCEDURE — 85014 HEMATOCRIT: CPT

## 2022-08-11 RX ORDER — IBUPROFEN 600 MG/1
600 TABLET ORAL EVERY 6 HOURS PRN
Qty: 20 TABLET | Refills: 0 | Status: SHIPPED | OUTPATIENT
Start: 2022-08-11 | End: 2023-01-26

## 2022-08-11 RX ADMIN — DOCUSATE SODIUM 100 MG: 100 CAPSULE, LIQUID FILLED ORAL at 07:35

## 2022-08-11 RX ADMIN — IBUPROFEN 600 MG: 600 TABLET ORAL at 07:35

## 2022-08-11 RX ADMIN — FERROUS SULFATE TAB 325 MG (65 MG ELEMENTAL FE) 325 MG: 325 (65 FE) TAB at 07:35

## 2022-08-11 NOTE — DISCHARGE SUMMARY
Discharge Summary    Date of Admission: 2022  Date of Discharge:  2022      Patient: Brittanie Bentley      MR#:[id number]    Delivery Provider: Greta Mccann         Presenting Problem/History of Present Illness  Term pregnancy [Z34.90]     Patient Active Problem List   Diagnosis   • Pelvic pain   • Pregnancy   • False labor before 37 completed weeks of gestation in third trimester         Discharge Diagnosis: Vaginal delivery at 39w2d    Procedures:  Vaginal, Spontaneous     2022    1:16 PM        Discharge Date: 2022;     Hospital Course  Patient is a 26 y.o. female  at 39w2d status post vaginal delivery without complication. Postpartum the patient did well. She remained afebrile, with vital signs stable. She was ready for discharge on postpartum day 2.     Infant:   male  fetus 3885 g (8 lb 9 oz)  with Apgar scores of 8 , 9  at five minutes.    Condition on Discharge:  Stable    Vital Signs  Temp:  [97.8 °F (36.6 °C)-98.2 °F (36.8 °C)] 97.8 °F (36.6 °C)  Heart Rate:  [74-75] 75  Resp:  [16-18] 16  BP: (107-114)/(61-64) 114/64    Lab Results   Component Value Date    WBC 12.37 (H) 08/10/2022    HGB 7.4 (L) 2022    HCT 25.1 (L) 2022    MCV 73.8 (L) 08/10/2022     (L) 08/10/2022       Discharge Disposition  Home or Self Care    Discharge Medications     Discharge Medications      New Medications      Instructions Start Date   ibuprofen 600 MG tablet  Commonly known as: ADVIL,MOTRIN   600 mg, Oral, Every 6 Hours PRN         Continue These Medications      Instructions Start Date   famotidine 20 MG tablet  Commonly known as: PEPCID   20 mg, Oral, 2 Times Daily PRN      Prenatal 27-1 27-1 MG tablet tablet   1 tablet, Oral, Daily             Discharge Diet:     Activity at Discharge:     Follow-up Appointments  Future Appointments   Date Time Provider Department Center   2022  4:10 PM Greta Mccann MD [facility]  [facility]         Greta Mccann MD  22  08:35  EDT  [facility]

## 2022-08-25 ENCOUNTER — TELEPHONE (OUTPATIENT)
Dept: OBSTETRICS AND GYNECOLOGY | Facility: CLINIC | Age: 27
End: 2022-08-25

## 2022-08-25 NOTE — TELEPHONE ENCOUNTER
She may have seen a suture on the toilet paper. She had a second degree tear that was repaired 8/9/2022. She may have a slight vag odor without fever. Use her antonietta bottle, be gentle wiping and keep clean. Come in for fever, foul odor or heavy bleeding.

## 2022-09-22 ENCOUNTER — POSTPARTUM VISIT (OUTPATIENT)
Dept: OBSTETRICS AND GYNECOLOGY | Facility: CLINIC | Age: 27
End: 2022-09-22

## 2022-09-22 VITALS
BODY MASS INDEX: 25.94 KG/M2 | DIASTOLIC BLOOD PRESSURE: 76 MMHG | HEIGHT: 63 IN | SYSTOLIC BLOOD PRESSURE: 124 MMHG | WEIGHT: 146.4 LBS

## 2022-09-22 DIAGNOSIS — Z86.69 HISTORY OF MIGRAINE HEADACHES: ICD-10-CM

## 2022-09-22 DIAGNOSIS — Z30.011 ENCOUNTER FOR INITIAL PRESCRIPTION OF CONTRACEPTIVE PILLS: ICD-10-CM

## 2022-09-22 PROCEDURE — 0503F POSTPARTUM CARE VISIT: CPT | Performed by: NURSE PRACTITIONER

## 2022-09-22 RX ORDER — RIZATRIPTAN BENZOATE 5 MG/1
5 TABLET ORAL ONCE AS NEEDED
Qty: 30 TABLET | Refills: 1 | Status: SHIPPED | OUTPATIENT
Start: 2022-09-22 | End: 2023-03-28

## 2022-09-22 RX ORDER — NORETHINDRONE ACETATE AND ETHINYL ESTRADIOL 1MG-20(21)
1 KIT ORAL DAILY
Qty: 28 TABLET | Refills: 12 | Status: SHIPPED | OUTPATIENT
Start: 2022-09-22 | End: 2022-10-03

## 2022-09-22 NOTE — PROGRESS NOTES
Chief Complaint   Patient presents with   • Postpartum Care     6w2dPP       6 Week Postpartum Visit         Brittanie Bentley is a 26 y.o.  who presents today for a 6 week postpartum check. Pt states that she gets migraines but that she did not have them during her pregnancy but that they have started back. Pt states that she normally takes maxalt PRN and was wondering if it was safe to take while breastfeeding. If so pt asking if it can be sent to her pharmacy.    C/S: no     Vaginal, Spontaneous    Information for the patient's :  Db Liz [[id number]]   2022   male   Db Liz   3885 g (8 lb 9 oz)   Gestational Age: 39w2d          Baby Discharged: Discharged with Mom  Delivering Physician: Greta Mccann MD    At the time of delivery were you diagnosed with any of the following: None. The laceration was 2nd degree and is healing well. Patient describes vaginal bleeding as absent.  Patient is breast feeding.  She desires contraceptive methods: Loestrin FE for contraception.  Patient denies bowel or bladder issues.      Patient denies postpartum depression. Postpartum Depression Screening Questionnaire: score of 6, no treatment indicated.      Last Completed Pap Smear          Ordered - PAP SMEAR (Every 3 Years) Ordered on 2021  SCANNED - PAP SMEAR    2020  Done - in [place]              Is the patient due for a pap today? Yes.  Performed Today    The additional following portions of the patient's history were reviewed and updated as appropriate: allergies, current medications, past family history, past medical history, past social history and past surgical history.    Review of Systems   Constitutional: Negative.    Cardiovascular: Negative.    Gastrointestinal: Negative.    Genitourinary: Negative.    Neurological: Positive for headache ( h/o migraines).     All other systems reviewed and are negative.     I have  "reviewed and agree with the HPI, ROS, and historical information as entered above. Vera Younger, APRN    /76   Ht 160 cm (63\")   Wt 66.4 kg (146 lb 6.4 oz)   LMP 11/07/2021   Breastfeeding Yes   BMI 25.93 kg/m²     Physical Exam  Vitals and nursing note reviewed. Exam conducted with a chaperone present.   Constitutional:       Appearance: Normal appearance. She is normal weight.   Abdominal:      Palpations: Abdomen is soft.      Tenderness: There is no abdominal tenderness.   Genitourinary:     General: Normal vulva.      Labia:         Right: No rash, tenderness or lesion.         Left: No rash, tenderness or lesion.       Vagina: No vaginal discharge, erythema, tenderness, bleeding, lesions or prolapsed vaginal walls.      Cervix: No cervical motion tenderness, discharge, friability, lesion, erythema or cervical bleeding.      Uterus: Normal. Not enlarged and not tender.       Adnexa: Right adnexa normal and left adnexa normal.        Right: No mass or tenderness.          Left: No mass or tenderness.        Rectum: Normal. No external hemorrhoid.   Neurological:      Mental Status: She is alert.             Assessment and Plan    Problem List Items Addressed This Visit    None     Visit Diagnoses     Postpartum care following vaginal delivery    -  Primary    Relevant Orders    LIQUID-BASED PAP SMEAR, [facility] ([facility])    Encounter for initial prescription of contraceptive pills        Relevant Medications    norethindrone-ethinyl estradiol FE (Junel FE 1/20) 1-20 MG-MCG per tablet    History of migraine headaches        Relevant Medications    rizatriptan (MAXALT) 5 MG tablet          1. S/p Vaginal delivery, 6 weeks postpartum.  Doing well.   2. Will refill Rx maxalt for prn headaches.    3. Return to normal physical activity.  No pelvic restrictions.   4. Baby doing well.  5. Breastfeeding going well.  6. No si/sx of postpartum depression  7. Contraception: contraceptive methods: OCP " (estrogen/progesterone)      THELMA Phillips  09/22/2022

## 2022-09-27 LAB — REF LAB TEST METHOD: NORMAL

## 2022-09-28 ENCOUNTER — TELEPHONE (OUTPATIENT)
Dept: OBSTETRICS AND GYNECOLOGY | Facility: CLINIC | Age: 27
End: 2022-09-28

## 2022-09-28 NOTE — TELEPHONE ENCOUNTER
Informed patient that EMA Younger has not yet reviewed her pap smear results. Informed patient that once she has reviewed those then we will CB with plan of care. She v/u.

## 2022-09-28 NOTE — TELEPHONE ENCOUNTER
Pap reviewed. She is wanting to try OCP's since she just delivered but may want to revisit seeing Dr Garcia. She is breast feeding. She wants me to talk to Dr. Mccann tomorrow. Her pelvic pain because it  was so bad before her delivery. Would she need to see Dr. Mccann again before the referral is made?

## 2022-09-28 NOTE — TELEPHONE ENCOUNTER
PATIENT STATES SHE RECEIVED PAP RESULTS IN [facility] [employer] [facility] AND WOULD LIKE A CALL BACK TO DISCUSS.    CALL BACK [phone]

## 2022-09-29 NOTE — TELEPHONE ENCOUNTER
She has seen Dr. Garcia and was scheduled for surgery but was pregnant. She will try OCP's for at least 3 months since she is not having any problems right now. KS/RW

## 2022-10-03 ENCOUNTER — TELEPHONE (OUTPATIENT)
Dept: OBSTETRICS AND GYNECOLOGY | Facility: CLINIC | Age: 27
End: 2022-10-03

## 2022-10-03 RX ORDER — ACETAMINOPHEN AND CODEINE PHOSPHATE 120; 12 MG/5ML; MG/5ML
1 SOLUTION ORAL DAILY
Qty: 28 TABLET | Refills: 12 | Status: SHIPPED | OUTPATIENT
Start: 2022-10-03 | End: 2023-10-03

## 2022-10-03 NOTE — TELEPHONE ENCOUNTER
"S/w pt and she was started on OCP's after her last visit and expressed being a little frustrated because she had spoke quite a lot about wanting to continue to breastfeed and did not want a BC that would interfere with that. Pt states she took the Rx that was sent in for only one week and her milk supply almost completely \"dried up\". She d/c'd medication. Her milk supply is returning and she would like to have a POP called into her pharmacy. I let her know that Dr Mccann is out for the week but that I would speak with another provider and call her back with plan of care. Pt v/u  "

## 2023-03-27 ENCOUNTER — TELEPHONE (OUTPATIENT)
Dept: OBSTETRICS AND GYNECOLOGY | Facility: CLINIC | Age: 28
End: 2023-03-27
Payer: COMMERCIAL

## 2023-03-27 DIAGNOSIS — N93.9 ABNORMAL UTERINE BLEEDING (AUB): Primary | ICD-10-CM

## 2023-03-27 NOTE — TELEPHONE ENCOUNTER
Brittanie has long hx of heavy menstrual bleeding, pain, and ovarian cysts. This has become progressively worse over the last several months. She Is on POP due to hx of migraine. She has not had a pelvic US recently. Will have her come in tomorrow for US and see FRANK Lawson to discuss.

## 2023-03-27 NOTE — TELEPHONE ENCOUNTER
Caller: Brittanie Bentley    Relationship: Self    Best call back number: [phone]    What is the best time to reach you: ANYTIME    Who are you requesting to speak with (clinical staff, provider,  specific staff member): DR. LOPEZ OR STAFF    What was the call regarding: PT IS HAVING ISSUES WITH MENSTRUAL CYCLES THAT HAS BEEN ON GOING FOR YEARS BUT HAS BEEN GETTING WORSE - PT HAS HX OF OVARIAN CYSTS - PT WAS SUPPOSED TO HAVE SURGERY FOR ENDOMETRIOSIS BACK IN JAN 2022 BUT GOT PREGNANT - AFTER DELIVERY PT WAS PUT BACK ON B/C AND THE ISSUES HAVE GOTTEN WORSE - VERY HEAVY BLEEDING W/ BLOOD CLOTS WHILE ON CYCLE - PT IS CURRENTLY ON HER CYCLE AND HAS PASSED BLOOD CLOTS RANGING FROM JAWBREAKER SIZE TO BASEBALL SIZE - PT HAS WENT THROUGH 3 PADS AND 3 TAMPONS SINCE 7AM THIS MORNING -     PT WANTS TO SPEAK TO A NURSE BEFORE SHE SCHEDULES AN APPT     PLEASE CALL THE PT ASAP TO DISCUSS

## 2023-03-28 ENCOUNTER — TELEPHONE (OUTPATIENT)
Dept: OBSTETRICS AND GYNECOLOGY | Facility: CLINIC | Age: 28
End: 2023-03-28

## 2023-03-28 ENCOUNTER — OFFICE VISIT (OUTPATIENT)
Dept: OBSTETRICS AND GYNECOLOGY | Facility: CLINIC | Age: 28
End: 2023-03-28
Payer: COMMERCIAL

## 2023-03-28 VITALS
HEIGHT: 65 IN | WEIGHT: 132 LBS | SYSTOLIC BLOOD PRESSURE: 104 MMHG | DIASTOLIC BLOOD PRESSURE: 80 MMHG | BODY MASS INDEX: 21.99 KG/M2

## 2023-03-28 DIAGNOSIS — N92.0 MENORRHAGIA WITH REGULAR CYCLE: ICD-10-CM

## 2023-03-28 DIAGNOSIS — N93.9 ABNORMAL UTERINE BLEEDING (AUB): ICD-10-CM

## 2023-03-28 DIAGNOSIS — G43.109 MIGRAINE WITH AURA AND WITHOUT STATUS MIGRAINOSUS, NOT INTRACTABLE: ICD-10-CM

## 2023-03-28 DIAGNOSIS — N94.6 DYSMENORRHEA: Primary | ICD-10-CM

## 2023-03-28 DIAGNOSIS — N94.10 FEMALE DYSPAREUNIA: ICD-10-CM

## 2023-03-28 DIAGNOSIS — Z83.49 FAMILY HISTORY OF GRAVES' DISEASE: ICD-10-CM

## 2023-03-28 PROBLEM — O47.03 FALSE LABOR BEFORE 37 COMPLETED WEEKS OF GESTATION IN THIRD TRIMESTER: Status: RESOLVED | Noted: 2022-07-20 | Resolved: 2023-03-28

## 2023-03-28 PROBLEM — Z34.90 PREGNANCY: Status: RESOLVED | Noted: 2022-02-14 | Resolved: 2023-03-28

## 2023-03-28 NOTE — PROGRESS NOTES
Chief Complaint   Patient presents with   • Menorrhagia         Subjective   HPI  Brittanie Bentley is a 27 y.o. female, , Patient's last menstrual period was 2023 (exact date).. She presents for initial evaluation of menorrhagia with irregular cycles. She saturates 1 tampon(s)/pad(s) every 1 hour for several days. . The menstrual problem began 7 years ago.  In the past the patient has tried birth control pills/Nuvaring, IUD and nexplanon for treatment without success. The patient reports additional symptoms as heavy bleeding. The patient uses 1 of tampons/pads per hour. and pelvic pain.  Pt also reports that dyspareunia.  US was done today.     Thromboembolic Disease: none  History of hypertension: no  History of migraines: yes with aura  Tobacco Usage?: No     Additional OB/GYN History   Last Pap :   Last Completed Pap Smear          PAP SMEAR (Every 3 Years) Next due on 2022  LIQUID-BASED PAP SMEAR, [facility] ([facility])    2021  SCANNED - PAP SMEAR    2020  Done - in [place]                  Current Outpatient Medications:   •  norethindrone (MICRONOR) 0.35 MG tablet, Take 1 tablet by mouth Daily., Disp: 28 tablet, Rfl: 12  •  Drospirenone 4 MG tablet, Take 1 tablet by mouth Daily., Disp: 28 tablet, Rfl: 6     Past Medical History:   Diagnosis Date   • Bipolar 2 disorder (HCC) Spring 2020   • False labor before 37 completed weeks of gestation in third trimester 2022   • History of abnormal cervical Papanicolaou smear    • Migraine    • Pregnancy 2022    Cell free DNA screen; low risk; male.  Prev  term 6#10oz EFW 34 wks 55%ile        Past Surgical History:   Procedure Laterality Date   • LAPAROSCOPIC CHOLECYSTECTOMY      age 16    • ORIF ELBOW FRACTURE Left     age 8   • PERCUTANEOUS PINNING ELBOW FRACTURE     • WISDOM TOOTH EXTRACTION       The additional following portions of the patient's history were reviewed and updated as  "appropriate: allergies, current medications, past family history, past medical history, past social history, past surgical history and problem list.    Review of Systems   Genitourinary: Positive for dyspareunia and menstrual problem.   All other systems reviewed and are negative.      I have reviewed and agree with the HPI, ROS, and historical information as entered above. Corina MINESH Lawson, APRN    Objective   /80   Ht 165.1 cm (65\")   Wt 59.9 kg (132 lb)   LMP 03/25/2023 (Exact Date)   BMI 21.97 kg/m²     Physical Exam  Vitals and nursing note reviewed.   Constitutional:       General: She is not in acute distress.     Appearance: Normal appearance. She is not ill-appearing.   Pulmonary:      Effort: Pulmonary effort is normal. No respiratory distress.   Neurological:      Mental Status: She is alert and oriented to person, place, and time.   Psychiatric:         Mood and Affect: Mood normal.         Behavior: Behavior normal.         Thought Content: Thought content normal.         Judgment: Judgment normal.         Assessment & Plan     Assessment     Problem List Items Addressed This Visit        Family History    Family history of Graves' disease    Overview     Mother         Current Assessment & Plan     Thyroid labs ordered 3/28/23 for abnormal uterine bleeding and family history            Genitourinary and Reproductive     Abnormal uterine bleeding (AUB)    Relevant Medications    Drospirenone 4 MG tablet    Other Relevant Orders    TSH    Comprehensive Metabolic Panel    CBC (No Diff)    T4, Free    Hemoglobin A1c    Female dyspareunia    Dysmenorrhea - Primary    Relevant Medications    Drospirenone 4 MG tablet    Other Relevant Orders    TSH    Comprehensive Metabolic Panel    CBC (No Diff)    T4, Free    Hemoglobin A1c    Menorrhagia with regular cycle    Relevant Medications    Drospirenone 4 MG tablet    Other Relevant Orders    TSH    Comprehensive Metabolic Panel    CBC (No Diff)    T4, " Free    Hemoglobin A1c       Neuro    Migraine with aura and without status migrainosus, not intractable    Current Assessment & Plan     No meds since having last baby-encouraged to call her Neurologist to schedule an appointment            Patient states  doctor told her that she probably his endometriosis prior to her last pregnancy and was scheduled for lap but got pregnant a month before it was scheduled so was unable to complete.    Hx ASCUS last pap smear    Hx ovarian cysts per patient  Plan     1. Vaginal ultrasound today showed endometrial thickness 9.6mm, no fibroids or ovarian cysts, no free fluid visualized.  2. Lab(s) Ordered  3. Medication(s) Ordered  4. Discussed options both medical and surgical.  5. Discussed potential etiologies and treatment options.   6. Patient has had chronic issues with menorrhagia, AUB between periods and dysmenorrhea. She has tried KT, POC, IUD (paraguard and mirena) without success. Will send e rx with pa for Slynd to try instead of Micronor r/t her migraine with aura history.   7. Follow up with Dr. Mccann for other invasive options such as ablation, etc.       Corina Lawson, APRN  03/28/2023

## 2023-03-29 LAB
ALBUMIN SERPL-MCNC: 4.3 G/DL (ref 3.5–5.2)
ALBUMIN/GLOB SERPL: 1.6 G/DL
ALP SERPL-CCNC: 67 U/L (ref 39–117)
ALT SERPL-CCNC: 11 U/L (ref 1–33)
AST SERPL-CCNC: 15 U/L (ref 1–32)
BILIRUB SERPL-MCNC: 0.2 MG/DL (ref 0–1.2)
BUN SERPL-MCNC: 10 MG/DL (ref 6–20)
BUN/CREAT SERPL: 16.4 (ref 7–25)
CALCIUM SERPL-MCNC: 9.3 MG/DL (ref 8.6–10.5)
CHLORIDE SERPL-SCNC: 101 MMOL/L (ref 98–107)
CO2 SERPL-SCNC: 25.2 MMOL/L (ref 22–29)
CREAT SERPL-MCNC: 0.61 MG/DL (ref 0.57–1)
EGFRCR SERPLBLD CKD-EPI 2021: 125.8 ML/MIN/1.73
ERYTHROCYTE [DISTWIDTH] IN BLOOD BY AUTOMATED COUNT: 15.1 % (ref 12.3–15.4)
GLOBULIN SER CALC-MCNC: 2.7 GM/DL
GLUCOSE SERPL-MCNC: 79 MG/DL (ref 65–99)
HBA1C MFR BLD: 4.8 % (ref 4.8–5.6)
HCT VFR BLD AUTO: 38.4 % (ref 34–46.6)
HGB BLD-MCNC: 12.2 G/DL (ref 12–15.9)
MCH RBC QN AUTO: 26.3 PG (ref 26.6–33)
MCHC RBC AUTO-ENTMCNC: 31.8 G/DL (ref 31.5–35.7)
MCV RBC AUTO: 82.9 FL (ref 79–97)
PLATELET # BLD AUTO: 202 10*3/MM3 (ref 140–450)
POTASSIUM SERPL-SCNC: 4 MMOL/L (ref 3.5–5.2)
PROT SERPL-MCNC: 7 G/DL (ref 6–8.5)
RBC # BLD AUTO: 4.63 10*6/MM3 (ref 3.77–5.28)
SODIUM SERPL-SCNC: 138 MMOL/L (ref 136–145)
T4 FREE SERPL-MCNC: 1.42 NG/DL (ref 0.93–1.7)
TSH SERPL DL<=0.005 MIU/L-ACNC: 1.2 UIU/ML (ref 0.27–4.2)
WBC # BLD AUTO: 5.83 10*3/MM3 (ref 3.4–10.8)

## 2023-08-22 ENCOUNTER — APPOINTMENT (OUTPATIENT)
Dept: ULTRASOUND IMAGING | Facility: HOSPITAL | Age: 28
End: 2023-08-22
Payer: COMMERCIAL

## 2023-08-22 ENCOUNTER — HOSPITAL ENCOUNTER (EMERGENCY)
Facility: HOSPITAL | Age: 28
Discharge: HOME | End: 2023-08-22
Attending: EMERGENCY MEDICINE | Admitting: EMERGENCY MEDICINE
Payer: COMMERCIAL

## 2023-08-22 ENCOUNTER — APPOINTMENT (OUTPATIENT)
Dept: CT IMAGING | Facility: HOSPITAL | Age: 28
End: 2023-08-22
Payer: COMMERCIAL

## 2023-08-22 VITALS
HEART RATE: 100 BPM | SYSTOLIC BLOOD PRESSURE: 100 MMHG | TEMPERATURE: 98.1 F | HEIGHT: 64 IN | OXYGEN SATURATION: 98 % | DIASTOLIC BLOOD PRESSURE: 75 MMHG | RESPIRATION RATE: 16 BRPM | BODY MASS INDEX: 21.34 KG/M2 | WEIGHT: 125 LBS

## 2023-08-22 DIAGNOSIS — R10.32 ACUTE BILATERAL LOWER ABDOMINAL PAIN: Primary | ICD-10-CM

## 2023-08-22 DIAGNOSIS — R10.31 ACUTE BILATERAL LOWER ABDOMINAL PAIN: Primary | ICD-10-CM

## 2023-08-22 DIAGNOSIS — K59.00 CONSTIPATION, UNSPECIFIED CONSTIPATION TYPE: ICD-10-CM

## 2023-08-22 DIAGNOSIS — R19.7 DIARRHEA, UNSPECIFIED TYPE: ICD-10-CM

## 2023-08-22 LAB
ALBUMIN SERPL-MCNC: 4.1 G/DL (ref 3.5–5.2)
ALBUMIN/GLOB SERPL: 1.4 G/DL
ALP SERPL-CCNC: 67 U/L (ref 39–117)
ALT SERPL W P-5'-P-CCNC: 12 U/L (ref 1–33)
ANION GAP SERPL CALCULATED.3IONS-SCNC: 12 MMOL/L (ref 5–15)
AST SERPL-CCNC: 19 U/L (ref 1–32)
B-HCG UR QL: NEGATIVE
BACTERIA UR QL AUTO: ABNORMAL /HPF
BASOPHILS # BLD AUTO: 0.01 10*3/MM3 (ref 0–0.2)
BASOPHILS NFR BLD AUTO: 0.4 % (ref 0–1.5)
BILIRUB SERPL-MCNC: 0.2 MG/DL (ref 0–1.2)
BILIRUB UR QL STRIP: NEGATIVE
BUN SERPL-MCNC: 8 MG/DL (ref 6–20)
BUN/CREAT SERPL: 12.5 (ref 7–25)
CALCIUM SPEC-SCNC: 8.8 MG/DL (ref 8.6–10.5)
CHLORIDE SERPL-SCNC: 104 MMOL/L (ref 98–107)
CLARITY UR: CLEAR
CO2 SERPL-SCNC: 24 MMOL/L (ref 22–29)
COLOR UR: YELLOW
CREAT SERPL-MCNC: 0.64 MG/DL (ref 0.57–1)
DEPRECATED RDW RBC AUTO: 44 FL (ref 37–54)
EGFRCR SERPLBLD CKD-EPI 2021: 124.4 ML/MIN/1.73
EOSINOPHIL # BLD AUTO: 0.1 10*3/MM3 (ref 0–0.4)
EOSINOPHIL NFR BLD AUTO: 3.7 % (ref 0.3–6.2)
ERYTHROCYTE [DISTWIDTH] IN BLOOD BY AUTOMATED COUNT: 14 % (ref 12.3–15.4)
GLOBULIN UR ELPH-MCNC: 2.9 GM/DL
GLUCOSE SERPL-MCNC: 93 MG/DL (ref 65–99)
GLUCOSE UR STRIP-MCNC: NEGATIVE MG/DL
HCT VFR BLD AUTO: 42.4 % (ref 34–46.6)
HGB BLD-MCNC: 13.2 G/DL (ref 12–15.9)
HGB UR QL STRIP.AUTO: NEGATIVE
HYALINE CASTS UR QL AUTO: ABNORMAL /LPF
IMM GRANULOCYTES # BLD AUTO: 0.01 10*3/MM3 (ref 0–0.05)
IMM GRANULOCYTES NFR BLD AUTO: 0.4 % (ref 0–0.5)
KETONES UR QL STRIP: ABNORMAL
LARGE PLATELETS: NORMAL
LEUKOCYTE ESTERASE UR QL STRIP.AUTO: ABNORMAL
LIPASE SERPL-CCNC: 26 U/L (ref 13–60)
LYMPHOCYTES # BLD AUTO: 0.8 10*3/MM3 (ref 0.7–3.1)
LYMPHOCYTES NFR BLD AUTO: 30 % (ref 19.6–45.3)
MCH RBC QN AUTO: 26.7 PG (ref 26.6–33)
MCHC RBC AUTO-ENTMCNC: 31.1 G/DL (ref 31.5–35.7)
MCV RBC AUTO: 85.8 FL (ref 79–97)
MONOCYTES # BLD AUTO: 0.4 10*3/MM3 (ref 0.1–0.9)
MONOCYTES NFR BLD AUTO: 15 % (ref 5–12)
NEUTROPHILS NFR BLD AUTO: 1.35 10*3/MM3 (ref 1.7–7)
NEUTROPHILS NFR BLD AUTO: 50.5 % (ref 42.7–76)
NITRITE UR QL STRIP: NEGATIVE
NRBC BLD AUTO-RTO: 0 /100 WBC (ref 0–0.2)
PH UR STRIP.AUTO: 6 [PH] (ref 5–8)
PLATELET # BLD AUTO: 190 10*3/MM3 (ref 140–450)
PMV BLD AUTO: 10.4 FL (ref 6–12)
POTASSIUM SERPL-SCNC: 3.7 MMOL/L (ref 3.5–5.2)
PROT SERPL-MCNC: 7 G/DL (ref 6–8.5)
PROT UR QL STRIP: ABNORMAL
RBC # BLD AUTO: 4.94 10*6/MM3 (ref 3.77–5.28)
RBC # UR STRIP: ABNORMAL /HPF
RBC MORPH BLD: NORMAL
REF LAB TEST METHOD: ABNORMAL
SODIUM SERPL-SCNC: 140 MMOL/L (ref 136–145)
SP GR UR STRIP: 1.03 (ref 1–1.03)
SQUAMOUS #/AREA URNS HPF: ABNORMAL /HPF
UROBILINOGEN UR QL STRIP: ABNORMAL
WBC # UR STRIP: ABNORMAL /HPF
WBC MORPH BLD: NORMAL
WBC NRBC COR # BLD: 2.67 10*3/MM3 (ref 3.4–10.8)

## 2023-08-22 PROCEDURE — 36415 COLL VENOUS BLD VENIPUNCTURE: CPT

## 2023-08-22 PROCEDURE — 74177 CT ABD & PELVIS W/CONTRAST: CPT

## 2023-08-22 PROCEDURE — 76830 TRANSVAGINAL US NON-OB: CPT

## 2023-08-22 PROCEDURE — 83690 ASSAY OF LIPASE: CPT | Performed by: EMERGENCY MEDICINE

## 2023-08-22 PROCEDURE — 80053 COMPREHEN METABOLIC PANEL: CPT | Performed by: EMERGENCY MEDICINE

## 2023-08-22 PROCEDURE — 81001 URINALYSIS AUTO W/SCOPE: CPT | Performed by: EMERGENCY MEDICINE

## 2023-08-22 PROCEDURE — 85025 COMPLETE CBC W/AUTO DIFF WBC: CPT | Performed by: EMERGENCY MEDICINE

## 2023-08-22 PROCEDURE — 25510000001 IOPAMIDOL 61 % SOLUTION: Performed by: EMERGENCY MEDICINE

## 2023-08-22 PROCEDURE — 81025 URINE PREGNANCY TEST: CPT | Performed by: EMERGENCY MEDICINE

## 2023-08-22 PROCEDURE — 85007 BL SMEAR W/DIFF WBC COUNT: CPT | Performed by: EMERGENCY MEDICINE

## 2023-08-22 PROCEDURE — 99285 EMERGENCY DEPT VISIT HI MDM: CPT

## 2023-08-22 RX ORDER — DICYCLOMINE HCL 20 MG
20 TABLET ORAL EVERY 8 HOURS PRN
Qty: 12 TABLET | Refills: 0 | Status: SHIPPED | OUTPATIENT
Start: 2023-08-22

## 2023-08-22 RX ADMIN — IOPAMIDOL 85 ML: 612 INJECTION, SOLUTION INTRAVENOUS at 16:26

## 2023-08-22 NOTE — DISCHARGE INSTRUCTIONS
Please keep all follow-up appointments.   [Severe] : severe swelling of lateral ankle [] : uses wheelchair [Left] : left ankle [There are no fractures, subluxations or dislocations. No significant abnormalities are seen] : There are no fractures, subluxations or dislocations. No significant abnormalities are seen

## 2023-08-22 NOTE — ED PROVIDER NOTES
[facility]    EMERGENCY DEPARTMENT ENCOUNTER      Pt Name: Brittanie Bentley  MRN: [id number]  YOB: 1995  Date of evaluation: 2023  Provider: Frank Contreras MD    CHIEF COMPLAINT       Chief Complaint   Patient presents with    Abdominal Pain         HISTORY OF PRESENT ILLNESS   Brittanie Bentley is a 27 y.o. female who presents to the emergency department with intermittent lower abdominal cramping over the last couple of weeks along with intermittent diarrhea and constipation.  No vomiting, fever, chills.  No urinary symptoms.  Has history of cholecystectomy but no other prior abdominal surgeries.      Nursing notes were reviewed.    REVIEW OF SYSTEMS     ROS:  A chief complaint appropriate review of systems was completed and is negative except as noted in the HPI.      PAST MEDICAL HISTORY     Past Medical History:   Diagnosis Date    Bipolar 2 disorder Spring 2020    False labor before 37 completed weeks of gestation in third trimester 2022    History of abnormal cervical Papanicolaou smear     Migraine     Pregnancy 2022    Cell free DNA screen; low risk; male.  Prev  term 6#10oz EFW 34 wks 55%ile         SURGICAL HISTORY       Past Surgical History:   Procedure Laterality Date    LAPAROSCOPIC CHOLECYSTECTOMY      age 16     ORIF ELBOW FRACTURE Left     age 8    PERCUTANEOUS PINNING ELBOW FRACTURE      WISDOM TOOTH EXTRACTION           CURRENT MEDICATIONS     No current facility-administered medications for this encounter.    Current Outpatient Medications:     dicyclomine (BENTYL) 20 MG tablet, Take 1 tablet by mouth Every 8 (Eight) Hours As Needed for Abdominal Cramping., Disp: 12 tablet, Rfl: 0    Drospirenone 4 MG tablet, Take 1 tablet by mouth Daily., Disp: 28 tablet, Rfl: 6    ALLERGIES     Patient has no known allergies.    FAMILY HISTORY       Family History   Problem Relation Age of Onset    Uterine cancer Mother     Graves' disease Mother     Prostate  "cancer Maternal Grandfather     Colon cancer Neg Hx     Ovarian cancer Neg Hx     Breast cancer Neg Hx           SOCIAL HISTORY       Social History     Socioeconomic History    Marital status: Single   Tobacco Use    Smoking status: Former     Packs/day: 0.25     Years: 0.50     Pack years: 0.13     Types: Cigarettes     Quit date: 2016     Years since quittin.3    Smokeless tobacco: Never   Vaping Use    Vaping Use: Never used   Substance and Sexual Activity    Alcohol use: Not Currently     Comment: prior to pregnancy, 0-2x weekly - weekends    Drug use: Never    Sexual activity: Yes     Partners: Male     Birth control/protection: Condom         PHYSICAL EXAM    (up to 7 for level 4, 8 or more for level 5)     Vitals:    23 1305 23 1810   BP: 133/62 100/75   BP Location: Left arm    Patient Position: Sitting    Pulse: 100    Resp: 16    Temp: 98.1 øF (36.7 øC)    TempSrc: Oral    SpO2: 98%    Weight: 56.7 kg (125 lb)    Height: 162.6 cm (64\")        General: Awake, alert, no acute distress.  HEENT: Conjunctivae normal.  Neck: Trachea midline.  Cardiac: Heart regular rate, rhythm, no murmurs, rubs, or gallops  Lungs: Lungs are clear to auscultation, there is no wheezing, rhonchi, or rales. There is no use of accessory muscles.  Chest wall: There is no tenderness to palpation over the chest wall or over ribs  Abdomen: Mild bilateral lower abdominal tenderness.  No distention, rebound, or guarding.  Musculoskeletal: No deformity.  Neuro: Alert and oriented x 4.  Dermatology: Skin is warm and dry  Psych: Mentation is grossly normal, cognition is grossly normal. Affect is appropriate.        DIAGNOSTIC RESULTS     EKG: All EKGs are interpreted by the Emergency Department Physician who either signs or Co-signs this chart in the absence of a cardiologist.    No orders to display         RADIOLOGY:   [x] Radiologist's Report Reviewed:  US Non-ob Transvaginal   Final Result   Impression: "   Unremarkable pelvic ultrasound.            Electronically Signed: Odin Friedman MD     8/22/2023 5:13 PM CDT     Workstation ID: [id number]      CT Abdomen Pelvis With Contrast   Final Result   Impression:   No clearly acute abdominopelvic findings.      Low position of the cecum in the right pelvis. There is poor visualization/delineation of the appendiceal tip among adjacent right pelvic structures, however the base and midportion of the appendix appears unremarkable in appearance without evidence of    acute appendicitis.      Scattered fluid throughout the colon compatible with diarrheal state.         Electronically Signed: Rio Nicholson     8/22/2023 4:45 PM EDT     Workstation ID: [id number]          I ordered and independently reviewed the above noted radiographic studies.        LABS:    I have reviewed and interpreted all of the currently available lab results from this visit (if applicable):  Results for orders placed or performed during the hospital encounter of 08/22/23   Comprehensive Metabolic Panel    Specimen: Blood   Result Value Ref Range    Glucose 93 65 - 99 mg/dL    BUN 8 6 - 20 mg/dL    Creatinine 0.64 0.57 - 1.00 mg/dL    Sodium 140 136 - 145 mmol/L    Potassium 3.7 3.5 - 5.2 mmol/L    Chloride 104 98 - 107 mmol/L    CO2 24.0 22.0 - 29.0 mmol/L    Calcium 8.8 8.6 - 10.5 mg/dL    Total Protein 7.0 6.0 - 8.5 g/dL    Albumin 4.1 3.5 - 5.2 g/dL    ALT (SGPT) 12 1 - 33 U/L    AST (SGOT) 19 1 - 32 U/L    Alkaline Phosphatase 67 39 - 117 U/L    Total Bilirubin 0.2 0.0 - 1.2 mg/dL    Globulin 2.9 gm/dL    A/G Ratio 1.4 g/dL    BUN/Creatinine Ratio 12.5 7.0 - 25.0    Anion Gap 12.0 5.0 - 15.0 mmol/L    eGFR 124.4 >60.0 mL/min/1.73   Lipase    Specimen: Blood   Result Value Ref Range    Lipase 26 13 - 60 U/L   Urinalysis With Microscopic If Indicated (No Culture) - Urine, Clean Catch    Specimen: Urine, Clean Catch   Result Value Ref Range    Color, UA Yellow Yellow, Straw    Appearance, UA Clear Clear     pH, UA 6.0 5.0 - 8.0    Specific Gravity, UA 1.032 (H) 1.001 - 1.030    Glucose, UA Negative Negative    Ketones, UA 15 mg/dL (1+) (A) Negative    Bilirubin, UA Negative Negative    Blood, UA Negative Negative    Protein, UA Trace (A) Negative    Leuk Esterase, UA Trace (A) Negative    Nitrite, UA Negative Negative    Urobilinogen, UA 1.0 E.U./dL 0.2 - 1.0 E.U./dL   CBC Auto Differential    Specimen: Blood   Result Value Ref Range    WBC 2.67 (L) 3.40 - 10.80 10*3/mm3    RBC 4.94 3.77 - 5.28 10*6/mm3    Hemoglobin 13.2 12.0 - 15.9 g/dL    Hematocrit 42.4 34.0 - 46.6 %    MCV 85.8 79.0 - 97.0 fL    MCH 26.7 26.6 - 33.0 pg    MCHC 31.1 (L) 31.5 - 35.7 g/dL    RDW 14.0 12.3 - 15.4 %    RDW-SD 44.0 37.0 - 54.0 fl    MPV 10.4 6.0 - 12.0 fL    Platelets 190 140 - 450 10*3/mm3    Neutrophil % 50.5 42.7 - 76.0 %    Lymphocyte % 30.0 19.6 - 45.3 %    Monocyte % 15.0 (H) 5.0 - 12.0 %    Eosinophil % 3.7 0.3 - 6.2 %    Basophil % 0.4 0.0 - 1.5 %    Immature Grans % 0.4 0.0 - 0.5 %    Neutrophils, Absolute 1.35 (L) 1.70 - 7.00 10*3/mm3    Lymphocytes, Absolute 0.80 0.70 - 3.10 10*3/mm3    Monocytes, Absolute 0.40 0.10 - 0.90 10*3/mm3    Eosinophils, Absolute 0.10 0.00 - 0.40 10*3/mm3    Basophils, Absolute 0.01 0.00 - 0.20 10*3/mm3    Immature Grans, Absolute 0.01 0.00 - 0.05 10*3/mm3    nRBC 0.0 0.0 - 0.2 /100 WBC   Scan Slide    Specimen: Blood   Result Value Ref Range    RBC Morphology Normal Normal    WBC Morphology Normal Normal    Large Platelets Slight/1+ None Seen   Urinalysis, Microscopic Only - Urine, Clean Catch    Specimen: Urine, Clean Catch   Result Value Ref Range    RBC, UA 0-2 None Seen, 0-2 /HPF    WBC, UA 3-5 (A) None Seen, 0-2 /HPF    Bacteria, UA None Seen None Seen, Trace /HPF    Squamous Epithelial Cells, UA 3-6 (A) None Seen, 0-2 /HPF    Hyaline Casts, UA 0-6 0 - 6 /LPF    Methodology Automated Microscopy    Pregnancy, Urine - Urine, Clean Catch    Specimen: Urine, Clean Catch   Result Value Ref Range     HCG, Urine QL Negative Negative        If labs were ordered, I independently reviewed the results and considered them in treating the patient.      EMERGENCY DEPARTMENT COURSE and DIFFERENTIAL DIAGNOSIS/MDM:   Vitals:  AS OF 22:05 EDT    BP - 100/75  HR - 100  TEMP - 98.1 øF (36.7 øC) (Oral)  O2 SATS - 98%        Discussion below represents my analysis of pertinent findings related to patient's condition, differential diagnosis, treatment plan and final disposition.      Differential diagnosis:  The differential diagnosis associated with the patient's presentation includes: Appendicitis, ovarian torsion, ectopic pregnancy, pregnancy, diverticulitis, SBO      Independent interpretations (ECG/rhythm strip/X-ray/US/CT scan): I independently interpreted the patient's abdominal CT and see no evidence of obstruction.  I independently interpreted the patient's cardiac monitor which shows sinus rhythm.      Care significantly affected by Social Determinants of Health (housing and economic circumstances, unemployment)    [] Yes     [x] No   If yes, Patient's care significantly limited by  Social Determinants of Health including:    [] Inadequate housing    [] Low income    [] Alcoholism and drug addiction in family    [] Problems related to primary support group    [] Unemployment    [] Problems related to employment    [] Other Social Determinants of Health:       ED Course:    ED Course as of 08/22/23 2205   Tue Aug 22, 2023   2203 Patient presents with cramping bilateral lower abdominal pain for several weeks along with intermittent diarrhea and constipation.  CT scan reveals cecum that is located lower than usual but no evidence of inflammation of the appendix and no focal right lower quadrant tenderness.  Pelvic ultrasound demonstrates no evidence of adnexal pathology.  Labs are reassuring apart from some mild leukopenia.  We will refer the patient to gastroenterology.  No evidence of emergent pathology at this time.  [NS]      ED Course User Index  [NS] Frank Contreras MD             I had a discussion with the patient/family regarding diagnosis, diagnostic results, treatment plan, and medications.  The patient/family indicated understanding of these instructions.  I spent adequate time at the bedside preceding discharge necessary to personally discuss the aftercare instructions, giving patient education, providing explanations of the results of our evaluations/findings, and my decision making to assure that the patient/family understand the plan of care.  Time was allotted to answer questions at that time and throughout the ED course.  Emphasis was placed on timely follow-up after discharge.  I also discussed the potential for the development of an acute emergent condition requiring further evaluation, admission, or even surgical intervention. I discussed that we found nothing during the visit today indicating the need for further workup, admission, or the presence of an unstable medical condition.  I encouraged the patient to return to the emergency department immediately for ANY concerns, worsening, new complaints, or if symptoms persist and unable to seek follow-up in a timely fashion.  The patient/family expressed understanding and agreement with this plan.  The patient will follow-up with their PCP in 1-2 days for reevaluation.           PROCEDURES:  Procedures    CRITICAL CARE TIME        FINAL IMPRESSION      1. Acute bilateral lower abdominal pain    2. Constipation, unspecified constipation type    3. Diarrhea, unspecified type          DISPOSITION/PLAN     ED Disposition       ED Disposition   Discharge    Condition   Stable    Comment   --                 Comment: Please note this report has been produced using speech recognition software.      Frank Contreras MD  Attending Emergency Physician             Frank Contreras MD  08/22/23 4665

## 2023-11-22 ENCOUNTER — TELEPHONE (OUTPATIENT)
Dept: OBSTETRICS AND GYNECOLOGY | Facility: CLINIC | Age: 28
End: 2023-11-22
Payer: COMMERCIAL

## 2023-11-22 NOTE — TELEPHONE ENCOUNTER
LMP 10/27  Pt never took pregnancy test  Pt stated her period was late and had tender breasts and nauseous   Pt stated she thought she started what she thought was her period on 11/21 thought she passed tissue that was large, pt stated she is having severe cramps and extreme fatigue   Pt thinks she is having a miscarriage

## 2023-11-22 NOTE — TELEPHONE ENCOUNTER
Returned patient's call. Last visit here was 3/28/23. Reports had breast tenderness and nausea that started about 2 weeks ago. States she started having spotting and mild cramping the evening of 11/20/23. States bleeding increased yesterday and very strong cramping, passed what she thinks was tissue about an inch long. She normally has very heavy periods with strong cramping and passes large clots. Today having normal period cramping and moderate period flow. MBT is A positive. Has been using withdrawal method to prevent pregnancy. States she is currently at the store purchasing a UPT. Advised her to do the UPT and call back with results. She v/u and agreed.

## 2023-11-22 NOTE — TELEPHONE ENCOUNTER
Called patient to follow up. States she did UPT and it was negative. Her LMP was 10/27/23. Discussed that she is likely just having her period. Advised her to call if symptoms do not resolve like a usual period. She v/u and agreed. Discussed with THELMA Aggarwal. She agreed with instructions.

## 2024-03-21 ENCOUNTER — TELEPHONE (OUTPATIENT)
Dept: OBSTETRICS AND GYNECOLOGY | Facility: CLINIC | Age: 29
End: 2024-03-21
Payer: COMMERCIAL

## 2024-03-21 DIAGNOSIS — O20.0 THREATENED MISCARRIAGE: Primary | ICD-10-CM

## 2024-03-21 NOTE — TELEPHONE ENCOUNTER
Returned patient's call. Patient of Dr. Mccann; LOV 3/28/23.   MBT is A positive  LMP 1/12/24   +UPT 2/14/24  Reports had scant amount of dark brown spotting about 1 week after she had the +UPT.   Had another episode of spotting that resolved.   Reports on 3/9/24 started having red bleeding like a heavy period. Passed clots on 3/11/24 ; one the size of her fist, one 1/2 that size, and other smaller clots. Was having very strong cramping.   States today her cramping is very mild; bleeding is still like a period and still passing some clots that are quarter size or larger.   Discussed with Dr. Mccann. She stated patient needs to be seen tomorrow with labs and ultrasound. Informed patient. She v/u and agreed. Appointment scheduled in our [place] office.

## 2024-03-21 NOTE — TELEPHONE ENCOUNTER
Patient called and said she was pregnant, she tested herself, but thinks she has had a miscarriage, she's passed blood clots and is cramping a lot, was trying to take care of it herself but wondering now if she needs to come in

## 2024-03-22 ENCOUNTER — OFFICE VISIT (OUTPATIENT)
Dept: OBSTETRICS AND GYNECOLOGY | Facility: CLINIC | Age: 29
End: 2024-03-22
Payer: COMMERCIAL

## 2024-03-22 VITALS
SYSTOLIC BLOOD PRESSURE: 110 MMHG | RESPIRATION RATE: 18 BRPM | WEIGHT: 130 LBS | DIASTOLIC BLOOD PRESSURE: 68 MMHG | BODY MASS INDEX: 22.2 KG/M2 | HEIGHT: 64 IN

## 2024-03-22 DIAGNOSIS — O03.4 RETAINED PRODUCTS OF CONCEPTION AFTER MISCARRIAGE: Primary | ICD-10-CM

## 2024-03-22 LAB
B-HCG UR QL: NEGATIVE
EXPIRATION DATE: NORMAL
INTERNAL NEGATIVE CONTROL: NORMAL
INTERNAL POSITIVE CONTROL: NORMAL
Lab: NORMAL

## 2024-03-22 RX ORDER — MISOPROSTOL 200 UG/1
TABLET ORAL
Qty: 8 TABLET | Refills: 0 | Status: SHIPPED | OUTPATIENT
Start: 2024-03-22

## 2024-03-22 NOTE — PROGRESS NOTES
"    Chief Complaint   Patient presents with    Miscarriage            HPI  Brittanie Bentley is a 28 y.o. female, , who presents for a Threatened AB.  She unsure if she has had passage of tissue. Her bleeding today is much heavier than a period, passing clots, and changing protection every 1 hour. She complains of cramping pain.  The pain is located in her pelvis and and her back.. Her past medical history is notable for spontaneous . She also complains of nausea and back pain.    Recent Tests:  US today: yes  Rh Status: Positive      The additional following portions of the patient's history were reviewed and updated as appropriate: allergies, current medications, past family history, past medical history, past social history, past surgical history, and problem list.    Review of Systems   Constitutional: Negative.  Negative for fever.   Gastrointestinal:  Positive for nausea.   Genitourinary:  Positive for vaginal bleeding.         I have reviewed and agree with the HPI, ROS, and historical information as entered above. Chloe Kwan, APRN      Objective   /68   Resp 18   Ht 162.6 cm (64.02\")   Wt 59 kg (130 lb)   LMP 2024 (Exact Date)   BMI 22.30 kg/m²     Physical Exam  Constitutional:       Appearance: Normal appearance.   Pulmonary:      Effort: Pulmonary effort is normal.   Neurological:      Mental Status: She is alert.            Assessment and Plan    Problem List Items Addressed This Visit    None  Visit Diagnoses       Retained products of conception after miscarriage    -  Primary    Relevant Medications    miSOPROStol (Cytotec) 200 MCG tablet    Other Relevant Orders    POC Pregnancy, Urine (Completed)    HCG, B-subunit, Quantitative    CBC (No Diff)    US Ob Transvaginal          She is sure of her LMP 24, pos UPT 24, spotted some but began bleeding heavy with clots on 3/6/24 and has continued to have daily bleeding. U/S today shows EMC 24 mm, RPOC " vs clot. Reviewed ultrasound and the course of her pregnancy and bleeding with Dr. Mccann. Will proceed with cytotec  She is A+    SAB with RPOC  Pelvic Rest.  No douching, intercourse or use of tampons.  Options discussed with patient.  Cytotec prescribed.  Bleeding precautions reviewed.  Report to ED if saturating a pad greater than every 30-60 mins or fever > 100.4.  Follow Up: Return for Wed 3/27/24 U/S KS.  Return for Wed 3/27/24 U/S KS.  She will likely desire mirena IUD once this has resolved. Discussed HCG til negative.     Counseling was given to patient for the following topics: diagnostic results, instructions for management, risks and benefits of treatment options, and importance of treatment compliance . Total time of the encounter was 30 minutes and 15 minutes was spend counseling.      Chloe Kwan, APRN  03/22/2024

## 2024-03-23 LAB
ERYTHROCYTE [DISTWIDTH] IN BLOOD BY AUTOMATED COUNT: 13.9 % (ref 12.3–15.4)
HCG INTACT+B SERPL-ACNC: NORMAL MIU/ML
HCT VFR BLD AUTO: 38.1 % (ref 34–46.6)
HGB BLD-MCNC: 12.4 G/DL (ref 12–15.9)
MCH RBC QN AUTO: 28.2 PG (ref 26.6–33)
MCHC RBC AUTO-ENTMCNC: 32.5 G/DL (ref 31.5–35.7)
MCV RBC AUTO: 86.6 FL (ref 79–97)
PLATELET # BLD AUTO: 214 10*3/MM3 (ref 140–450)
RBC # BLD AUTO: 4.4 10*6/MM3 (ref 3.77–5.28)
WBC # BLD AUTO: 6.78 10*3/MM3 (ref 3.4–10.8)

## 2024-03-25 ENCOUNTER — TELEPHONE (OUTPATIENT)
Dept: OBSTETRICS AND GYNECOLOGY | Facility: CLINIC | Age: 29
End: 2024-03-25
Payer: COMMERCIAL

## 2024-03-25 NOTE — TELEPHONE ENCOUNTER
Called pt and informed her that vaginal irritation is common after Cytotec. Misael RENEE recommended she continue to monitor until her follow up on Wednesday. If severe pain she can be seen sooner. Also call with any heavy bleeding or severe pelvic pain. Pt GERALD.

## 2024-03-25 NOTE — TELEPHONE ENCOUNTER
Siobhan patient. S/P MAB. Pt states she started bleeding on 3/9/24 that became heavier with clotting on 3/11/24. The heavy bleeding continued until her appt on 3/22/24, needing to change her pas every hour. She had an appt with [facility] on 3/22/24. TV[facility] showed: No gestational sac, EMC 24 mm, RPOC vs clot. Pt was prescribed 800 mcg of Cytotec and was instructed to repeat the dose if she did not have any vaginal bleeding in 24 hours. Pt states the bleeding decreased shortly after the first dose of Cytotec to spotting. States she took the second 800mcg dose 24 hours after the first and has continued to only spot. She has an follow up US and visit on 3/27/24 with Siobhan. Pt asking if she needs to do anything else in the meantime since her flow decreased after cytotec. Told pt I will discuss with provider and call her back.

## 2024-03-25 NOTE — TELEPHONE ENCOUNTER
"Discussed with Misael RENEE. As long as patient is not having any pelvic pain or fever then no need for any further treatment at this time. Keep follow up for Wednesday 3/27/24 and call with any fever or pelvic pain.     Called pt and informed her of Misael Taylor's recommendations. Pt denies any fever or pelvic pain. Advised pt to monitor for any fever until follow up. Reports vaginal rawness, burning and \"gritty\" feeling from vaginal tablets. Denies any itching or odor. Pt asking if she should be treated. Told pt I will ask Misael and call her back.   "

## 2024-03-25 NOTE — TELEPHONE ENCOUNTER
Pt is calling as she has used all the prescribed medication for her miscarriage and all the bleeding has now stopped and she was not sure if she should be concerned.

## 2024-03-27 ENCOUNTER — OFFICE VISIT (OUTPATIENT)
Dept: OBSTETRICS AND GYNECOLOGY | Facility: CLINIC | Age: 29
End: 2024-03-27
Payer: COMMERCIAL

## 2024-03-27 VITALS — DIASTOLIC BLOOD PRESSURE: 68 MMHG | SYSTOLIC BLOOD PRESSURE: 108 MMHG | HEIGHT: 64 IN | BODY MASS INDEX: 22.3 KG/M2

## 2024-03-27 DIAGNOSIS — O03.9 MISCARRIAGE: Primary | ICD-10-CM

## 2024-03-27 RX ORDER — PRENATAL VIT NO.126/IRON/FOLIC 28MG-0.8MG
TABLET ORAL DAILY
COMMUNITY

## 2024-03-27 NOTE — PROGRESS NOTES
Chief Complaint   Patient presents with    Miscarriage            HPI  Brittanie Bentley is a 28 y.o. female, , who presents for follow up on a Missed AB. At her last visit she chose to use cytotec for treatment. Since then she has passage of tissue. Her bleeding today is scant staining. She complains of bloating pain.  The pain is located in her lower abdomen.. Her past medical history is non-contributory. She also complains of chills this weekend during the rounds of Cytotec. Denies any chills since then. She was monitoring her temperature and denies any fevers.    Pt states she started bleeding on 3/9/24 that became heavier with clots on 3/11/24. The heavy bleeding continued until her appt on 3/22/24, needing to change her pad every hour. She had an appt with Chloe Kwan with [facility] on 3/22/24. [facility] showed: No gestational sac, EMC 24 mm, RPOC vs clot.   Pt was prescribed 800 mcg of Cytotec and was instructed to repeat the dose if she did not have any vaginal bleeding in 24 hours. Pt states the bleeding decreased to spotting shortly after the first dose. States she took the second 800mcg dose and has continued to only spot. Reports only scant bleeding for the past 2 days.    Recent Tests:  US done today: Yes.  Findings showed likely retained POC.  I have personally evaluated the U/S and agree with the findings. Greta Mccann MD   Rh Status: Positive      The additional following portions of the patient's history were reviewed and updated as appropriate: allergies, current medications, past family history, past medical history, past social history, past surgical history, and problem list.    Review of Systems   Constitutional: Negative.    HENT: Negative.     Eyes: Negative.    Respiratory: Negative.     Cardiovascular: Negative.    Gastrointestinal: Negative.    Endocrine: Negative.    Genitourinary:  Positive for vaginal bleeding.   Musculoskeletal: Negative.    Skin: Negative.   "  Allergic/Immunologic: Negative.    Neurological: Negative.    Hematological: Negative.    Psychiatric/Behavioral: Negative.           I have reviewed and agree with the HPI, ROS, and historical information as entered above. Greta Mccann MD      Objective   /68   Ht 162.6 cm (64.02\")   LMP 01/12/2024 (Exact Date)   BMI 22.30 kg/m²     Physical Exam  Vitals and nursing note reviewed.   Constitutional:       Appearance: Normal appearance.   HENT:      Head: Normocephalic and atraumatic.   Eyes:      General: No scleral icterus.     Pupils: Pupils are equal, round, and reactive to light.   Pulmonary:      Effort: Pulmonary effort is normal.      Breath sounds: Normal breath sounds.   Abdominal:      General: Bowel sounds are normal. There is no distension.      Palpations: Abdomen is soft.      Tenderness: There is no abdominal tenderness.   Musculoskeletal:         General: Normal range of motion.      Cervical back: Normal range of motion and neck supple.      Right lower leg: No edema.      Left lower leg: No edema.   Skin:     General: Skin is warm and dry.   Neurological:      General: No focal deficit present.      Mental Status: She is alert and oriented to person, place, and time.   Psychiatric:         Mood and Affect: Mood normal.         Behavior: Behavior normal. Behavior is cooperative.            Assessment and Plan    Problem List Items Addressed This Visit    None  Visit Diagnoses       Miscarriage    -  Primary    incomplete            Incomplete Sab. There appears to be retained POC on US today, no response to cytotec.   Schedule D&C.  No follow-ups on file.    I spent 30 minutes caring for Brittanie on this date of service. This time includes time spent by me in the following activities:preparing for the visit, reviewing tests, obtaining and/or reviewing a separately obtained history, counseling and educating the patient/family/caregiver, and documenting information in the medical " record        Greta Mccann MD  03/27/2024

## 2024-03-28 ENCOUNTER — OUTSIDE FACILITY SERVICE (OUTPATIENT)
Dept: OBSTETRICS AND GYNECOLOGY | Facility: CLINIC | Age: 29
End: 2024-03-28
Payer: COMMERCIAL

## 2024-03-28 ENCOUNTER — LAB REQUISITION (OUTPATIENT)
Dept: LAB | Facility: HOSPITAL | Age: 29
End: 2024-03-28
Payer: COMMERCIAL

## 2024-03-28 DIAGNOSIS — O02.1 MISSED ABORTION: ICD-10-CM

## 2024-03-28 PROCEDURE — 88305 TISSUE EXAM BY PATHOLOGIST: CPT | Performed by: OBSTETRICS & GYNECOLOGY

## 2024-03-29 LAB
CYTO UR: NORMAL
LAB AP CASE REPORT: NORMAL
LAB AP CLINICAL INFORMATION: NORMAL
PATH REPORT.FINAL DX SPEC: NORMAL
PATH REPORT.GROSS SPEC: NORMAL

## 2024-04-11 ENCOUNTER — OFFICE VISIT (OUTPATIENT)
Dept: OBSTETRICS AND GYNECOLOGY | Facility: CLINIC | Age: 29
End: 2024-04-11
Payer: COMMERCIAL

## 2024-04-11 VITALS
WEIGHT: 127.2 LBS | DIASTOLIC BLOOD PRESSURE: 62 MMHG | BODY MASS INDEX: 21.72 KG/M2 | SYSTOLIC BLOOD PRESSURE: 96 MMHG | HEIGHT: 64 IN

## 2024-04-11 DIAGNOSIS — R30.0 DYSURIA: Primary | ICD-10-CM

## 2024-04-11 DIAGNOSIS — O03.9 MISCARRIAGE: ICD-10-CM

## 2024-04-11 PROBLEM — N92.0 MENORRHAGIA WITH REGULAR CYCLE: Status: RESOLVED | Noted: 2023-03-28 | Resolved: 2024-04-11

## 2024-04-11 PROBLEM — N94.10 FEMALE DYSPAREUNIA: Status: RESOLVED | Noted: 2023-03-28 | Resolved: 2024-04-11

## 2024-04-11 PROBLEM — R10.2 PELVIC PAIN: Status: RESOLVED | Noted: 2021-08-25 | Resolved: 2024-04-11

## 2024-04-11 PROBLEM — N94.6 DYSMENORRHEA: Status: RESOLVED | Noted: 2023-03-28 | Resolved: 2024-04-11

## 2024-04-11 PROBLEM — N93.9 ABNORMAL UTERINE BLEEDING (AUB): Status: RESOLVED | Noted: 2023-03-28 | Resolved: 2024-04-11

## 2024-04-11 LAB
BILIRUB BLD-MCNC: NEGATIVE MG/DL
CLARITY, POC: CLEAR
COLOR UR: YELLOW
GLUCOSE UR STRIP-MCNC: NEGATIVE MG/DL
HCG INTACT+B SERPL-ACNC: 9.07 MIU/ML
KETONES UR QL: NEGATIVE
LEUKOCYTE EST, POC: NEGATIVE
NITRITE UR-MCNC: NEGATIVE MG/ML
PH UR: 6 [PH] (ref 5–8)
PROT UR STRIP-MCNC: NEGATIVE MG/DL
RBC # UR STRIP: NEGATIVE /UL
SP GR UR: 1.02 (ref 1–1.03)
T4 FREE SERPL-MCNC: 1.5 NG/DL (ref 0.93–1.7)
TSH SERPL DL<=0.005 MIU/L-ACNC: 1.05 UIU/ML (ref 0.27–4.2)
UROBILINOGEN UR QL: NORMAL

## 2024-04-11 NOTE — PROGRESS NOTES
OBGYN Postoperative Exam Note          Subjective   Chief Complaint   Patient presents with    Post-op     D&C     Brittanie Bentley is a 28 y.o. year old  presenting to be seen for her post-operative visit. She is S/P suction D & C on 3/28/24 at [facility] for  retained products of conception . Currently she reports no problems with eating, bowel movements, or wound drainage and pain is well controlled. Reports she may have a UTI, reports dysuria and frequency since last week. CCUA collected    The pathology results from her procedure are in Brittanie's record and are benign.      OTHER THINGS SHE WANTS TO DISCUSS TODAY:  Pt is requesting TSH be drawn today if possible. States she has been told she may be borderline hyperthyroid. Most recent thyroid labs drawn on 3/28/23 was WNL.     No current outpatient medications on file.     Past Medical History:   Diagnosis Date    Bipolar 2 disorder Spring 2020    False labor before 37 completed weeks of gestation in third trimester 2022    History of abnormal cervical Papanicolaou smear     Migraine     Pregnancy 2022    Cell free DNA screen; low risk; male.  Prev  term 6#10oz EFW 34 wks 55%ile        Past Surgical History:   Procedure Laterality Date    DILATATION AND CURETTAGE  2024    LAPAROSCOPIC CHOLECYSTECTOMY      age 16     ORIF ELBOW FRACTURE Left     age 8    PERCUTANEOUS PINNING ELBOW FRACTURE      WISDOM TOOTH EXTRACTION         The following portions of the patient's history were reviewed and updated as appropriate:current medications and allergies    Review of Systems   Constitutional: Negative.    HENT: Negative.     Eyes: Negative.    Respiratory: Negative.     Cardiovascular: Negative.    Gastrointestinal: Negative.    Endocrine: Negative.    Genitourinary:  Positive for dysuria and frequency.   Musculoskeletal: Negative.    Skin: Negative.    Allergic/Immunologic: Negative.    Neurological: Negative.    Hematological: Negative.   "  Psychiatric/Behavioral: Negative.            Objective   BP 96/62   Ht 162.6 cm (64.02\")   Wt 57.7 kg (127 lb 3.2 oz)   LMP 01/12/2024 (Exact Date)   BMI 21.82 kg/m²     Physical Exam  Vitals and nursing note reviewed.   Constitutional:       Appearance: Normal appearance.   HENT:      Head: Normocephalic and atraumatic.   Eyes:      General: No scleral icterus.     Pupils: Pupils are equal, round, and reactive to light.   Pulmonary:      Effort: Pulmonary effort is normal.      Breath sounds: Normal breath sounds.   Abdominal:      General: Bowel sounds are normal. There is no distension.      Palpations: Abdomen is soft.      Tenderness: There is no abdominal tenderness.   Musculoskeletal:         General: Normal range of motion.      Cervical back: Normal range of motion and neck supple.      Right lower leg: No edema.      Left lower leg: No edema.   Skin:     General: Skin is warm and dry.   Neurological:      General: No focal deficit present.      Mental Status: She is alert and oriented to person, place, and time.   Psychiatric:         Mood and Affect: Mood normal.         Behavior: Behavior normal. Behavior is cooperative.              Assessment   S/P suction D & C     Plan   May return to full activity with no restrictions  Pathology was reviewed with patient  The importance of keeping all planned follow-up and taking all medications as prescribed was emphasized.  Return for IUD Insertion- aaron.              Greta Mccann MD  04/11/2024     "

## 2024-04-15 LAB
BACTERIA UR CULT: ABNORMAL
BACTERIA UR CULT: ABNORMAL
OTHER ANTIBIOTIC SUSC ISLT: ABNORMAL

## 2024-04-16 RX ORDER — SULFAMETHOXAZOLE AND TRIMETHOPRIM 800; 160 MG/1; MG/1
1 TABLET ORAL 2 TIMES DAILY
Qty: 10 TABLET | Refills: 0 | Status: SHIPPED | OUTPATIENT
Start: 2024-04-16 | End: 2024-04-21

## 2024-04-25 ENCOUNTER — LAB (OUTPATIENT)
Dept: OBSTETRICS AND GYNECOLOGY | Facility: CLINIC | Age: 29
End: 2024-04-25
Payer: COMMERCIAL

## 2024-04-25 DIAGNOSIS — O03.9 MISCARRIAGE: Primary | ICD-10-CM

## 2024-04-26 LAB — HCG INTACT+B SERPL-ACNC: 3 MIU/ML

## 2024-05-03 ENCOUNTER — OFFICE VISIT (OUTPATIENT)
Dept: OBSTETRICS AND GYNECOLOGY | Facility: CLINIC | Age: 29
End: 2024-05-03
Payer: COMMERCIAL

## 2024-05-03 VITALS
HEIGHT: 64 IN | WEIGHT: 128.2 LBS | DIASTOLIC BLOOD PRESSURE: 62 MMHG | BODY MASS INDEX: 21.89 KG/M2 | SYSTOLIC BLOOD PRESSURE: 110 MMHG

## 2024-05-03 DIAGNOSIS — K64.4 EXTERNAL HEMORRHOIDS: ICD-10-CM

## 2024-05-03 DIAGNOSIS — Z30.430 ENCOUNTER FOR IUD INSERTION: Primary | ICD-10-CM

## 2024-05-03 RX ORDER — METHYLPHENIDATE HYDROCHLORIDE 18 MG/1
18 TABLET, EXTENDED RELEASE ORAL ONCE AS NEEDED
COMMUNITY

## 2024-05-03 NOTE — PROGRESS NOTES
"     Gynecologic Procedure Note        Procedure: IUD Insertion     Procedures    Pre procedure indication 1) Desires Mirena  Post procedure indication 1) Desires Mirena    NDC: Mirena 91423-316-41  Lot #: [id number]   Exp Date: JUL 2026  BH device    /62 (BP Location: Left arm, Patient Position: Sitting, Cuff Size: Adult)   Ht 162.6 cm (64.02\")   Wt 58.2 kg (128 lb 3.2 oz)   LMP 04/25/2024 (Approximate)   BMI 21.99 kg/m²       The risks, benefits, and alternatives to Mirena were explained at length with the patient. All her questions were answered and consents were signed.  Her LMP was 04/25/2024 .  Urine Pregnancy Test was Negative.  Patient does not have an allergy to betadine or shellfish.    Time out: immediate members of the procedure team and patient agree to the following: correct patient, correct site, correct procedure to be performed. Corina RENEE/    The patient was placed in a dorsal lithotomy position on the examining table in [facility]. A bimanual exam confirmed the uterus was anteverted. A speculum was inserted into the vagina and the cervix was brought into view.  The cervix was prepped with Betadine. The anterior lip of the cervix was then grasped with a single-tooth tenaculum. The endometrial cavity was then sounded to 9 centimeters. The sealed Mirena package was opened and the IUD was removed in a sterile fashion.    The upper edge of the depth setting the flange was set at the uterine sound measurement. The  was then carefully advanced to the cervical canal into the uterus to the level of the fundus.  The slider was then retracted about 1 cm and deployed the device. The device was then gently advanced to the fundus. The IUD was then released by pulling the slider down all the way. The  was removed carefully from the uterus. The threads were then cut leaving 2-3 cm visible outside of the cervix.  The single-tooth tenaculum was removed from the anterior lip. Good " hemostasis was noted.   All other instruments were removed from the vagina.       The patient tolerated the procedure well with a mild amount of discomfort.  She was monitored for 10 minutes prior to discharge.      There were no complications.    Motrin 600mg po given to patient after.    The patient was counseled about the need to return in 4 weeks for IUD check with ultrasound.    She was counseled about the need to use a backup method of contraception such as condoms until her post insertion exam was performed. The patient verbalized understanding when the IUD will need to be removed/replaced. Written information was given to the patient.  The patient is counseled to contact us if she has any significant or increasing bleeding, pain, fever, chills, or other concerns. She is instructed to see a doctor right away if she believes that she may be pregnant at any time with the IUD in place.      Patient requested GI referral for external hemorrhoid that has been there for 7 years and wants surgical removal.     Corina Lawson, APRN  05/03/2024

## 2024-05-20 ENCOUNTER — TELEPHONE (OUTPATIENT)
Dept: OBSTETRICS AND GYNECOLOGY | Facility: CLINIC | Age: 29
End: 2024-05-20
Payer: COMMERCIAL

## 2024-05-20 NOTE — TELEPHONE ENCOUNTER
Patient of Dr. Mccann; LOV 05/03/24 for Mirena IUD placement per THELMA Carreno. Next visit is 05/31/2024.   Returned patient's call.   States she started having itching one week after IUD was placed. States it is constant, over her face and entire body. States it is irritating but not intolerable.   Denies any rash or changes in skin color.   Denies using any new products or new medications.   Taking Benadryl at night with some relief.   States has had a Mirena IUD before and has taken several different OCPs in the past but has never had itching like this.   Discussed with THELMA Alcala. She stated itching is very unlikely to be related to the Mirena.   Informed patient. Suggested she see her PCP. If no other cause for itching is identified, advised her to call us back to discuss the IUD. She v/u and agreed.

## 2024-05-20 NOTE — TELEPHONE ENCOUNTER
Pt thinks she is having a reaction to her Mirena as since it was placed her body itches all over.

## 2024-05-21 NOTE — TELEPHONE ENCOUNTER
Ayaan RENEE saw this patient and ordered her slynd however desean just sent her a message and said it wasn't covered. PA needed. Pending She has a hx of migraines with auras. The PA was approved today until 12/31/2222 100 for 1 month. She does not want to do the savings card even if it is 25 for 3 months. 2 samples given until her apt with Siobhan. She has also had the Nexplanon and it got stuck removing it (with Dr. Bergeron) and she had multiple ovarian cysts. She has seen Dr. Garcia before her last pregnancy.   - - -

## 2024-05-30 DIAGNOSIS — Z30.431 IUD CHECK UP: Primary | ICD-10-CM

## 2024-05-31 ENCOUNTER — OFFICE VISIT (OUTPATIENT)
Dept: OBSTETRICS AND GYNECOLOGY | Facility: CLINIC | Age: 29
End: 2024-05-31
Payer: COMMERCIAL

## 2024-05-31 VITALS
DIASTOLIC BLOOD PRESSURE: 62 MMHG | SYSTOLIC BLOOD PRESSURE: 98 MMHG | HEIGHT: 64 IN | WEIGHT: 130.2 LBS | BODY MASS INDEX: 22.23 KG/M2

## 2024-05-31 DIAGNOSIS — Z30.431 IUD CHECK UP: Primary | ICD-10-CM

## 2024-05-31 DIAGNOSIS — N88.8 NABOTHIAN CYST: ICD-10-CM

## 2024-05-31 DIAGNOSIS — L29.9 PRURITUS, UNSPECIFIED: ICD-10-CM

## 2024-05-31 RX ORDER — METHYLPREDNISOLONE 4 MG/1
TABLET ORAL
Qty: 21 TABLET | Refills: 0 | Status: SHIPPED | OUTPATIENT
Start: 2024-05-31

## 2024-05-31 NOTE — PROGRESS NOTES
"    Chief Complaint   Patient presents with    Follow-up     IUD check         Subjective   HPI  Brittanie Bentley is a 28 y.o. female, , who presents for IUD check follow up.  She had a Mirena placed on 5/3/24. Since the IUD placement, the patient reports itching throughout body, reports \"all of skin is itchy\", denies rash or redness.  Pt also reports \"constant bleeding with small clots since IUD placed\" . She has had a period since the IUD was placed.    The additional following portions of the patient's history were reviewed and updated as appropriate: allergies, current medications, past family history, past medical history, past social history, past surgical history, and problem list.    Did the patient have u/s today? Yes.  Findings showed IUD had correct placement.  I have personally evaluated the U/S and agree with the findings. Corina Lawson, THELMA    Review of Systems   Genitourinary:  Positive for vaginal bleeding.   Skin:         Pruritus generalized without rash   All other systems reviewed and are negative.    All other systems reviewed and are negative.     I have reviewed and agree with the HPI, ROS, and historical information as entered above. Corina Lawson, THELMA      Objective   BP 98/62   Ht 162.6 cm (64\")   Wt 59.1 kg (130 lb 3.2 oz)   LMP 05/10/2024 (Approximate)   BMI 22.35 kg/m²     Physical Exam  Vitals and nursing note reviewed.   Constitutional:       General: She is not in acute distress.     Appearance: Normal appearance. She is not ill-appearing, toxic-appearing or diaphoretic.   HENT:      Head: Normocephalic.   Cardiovascular:      Rate and Rhythm: Normal rate.   Pulmonary:      Effort: Pulmonary effort is normal. No respiratory distress.   Abdominal:      Palpations: Abdomen is soft.   Skin:     General: Skin is warm and dry.      Findings: No rash.   Neurological:      Mental Status: She is alert and oriented to person, place, and time.   Psychiatric:         Mood " and Affect: Mood normal.         Behavior: Behavior normal.         Thought Content: Thought content normal.         Judgment: Judgment normal.         Assessment & Plan     Assessment     Problem List Items Addressed This Visit    None  Visit Diagnoses       IUD check up    -  Primary    Nabothian cyst        Pruritus, unspecified        Relevant Medications    methylPREDNISolone (MEDROL) 4 MG dose pack    Other Relevant Orders    Ambulatory Referral to Dermatology (Completed)              Plan     One month follow up following Mirena IUD placement with ultrasound  Ultrasound showed retroverted/displaced towards pt left uterus without malformations, homogeneous myometrium, endometrial thickness 9.5mm and nabothian cyst on cervix. IUD placed correctly at fundus of uterus. Left ovary not visualized.   Generalized pruritus for last month unsure if it is related to IUD placement or allergies-no rash. Antihistamines have been helping. Will rx steroid pack. And place dermatology referral.  Follow up PRN and annually    Corina Lawson, APRN  05/31/2024

## 2024-06-17 PROCEDURE — 87081 CULTURE SCREEN ONLY: CPT | Performed by: PHYSICIAN ASSISTANT

## 2024-06-25 ENCOUNTER — TELEPHONE (OUTPATIENT)
Dept: OBSTETRICS AND GYNECOLOGY | Facility: CLINIC | Age: 29
End: 2024-06-25
Payer: COMMERCIAL

## 2024-06-25 NOTE — TELEPHONE ENCOUNTER
PT had Mirena IUD on May 3rd - PT started her period on May the 10th and had constant bleeding since then, PT was told to call and let us know if she kept bleeding. She wanted to let us know the bleeding is now slowing down and may get to the point it may stop soon.     Her big issue now is the migraines she is having, PT has had a migraine since 06/14/2024 - PT states she has always had migraines but now they are constant and  she is unable to get any relief and would like to discuss getting back on migraine medications she used to be on. Best call back number is [phone]

## 2024-06-25 NOTE — TELEPHONE ENCOUNTER
Returned patients phone call.   IUD placed 5/3/24.   Reports bleeding has slowed down to pink spotting. She is not having any pain or discomfort.   Notified that irregular bleeding can occur for first 3-6 months of IUD placement.   Reports she has had migraine on and off since 6/14/24. She does have a history migraines and was been on multiple medications in the past to help with migraines including injections.   Recommended patient follow-up with her PCP regarding migraines and restarting medication.   Patient v/u and agreed.

## 2024-12-10 ENCOUNTER — TELEPHONE (OUTPATIENT)
Dept: OBSTETRICS AND GYNECOLOGY | Facility: CLINIC | Age: 29
End: 2024-12-10
Payer: COMMERCIAL

## 2024-12-10 NOTE — TELEPHONE ENCOUNTER
Caller: Brittanie Bentley    Relationship: Self    Best call back number: [phone]     What is the best time to reach you: CALL ANYTIME    Who are you requesting to speak with (clinical staff, provider,  specific staff member): FIRST AVAILABLE.    PATIENT SCHEDULED MIRENA REMOVAL ONLY FOR 12/16/24 AT 9:15 AM WITH JACKELIN MEI APRN. PATIENT IS REQUESTING A CALL BACK TO DISCUSS IF IT OKAY RECOMMENDED TO TAKE OVER THE COUNTER PAIN MEDICATION BEFORE OR AFTER APPT?

## 2024-12-10 NOTE — TELEPHONE ENCOUNTER
Patient of Dr. Mccann; LOV 05/31/24. NOV is 12/16/24 for IUD removal.  Returned patient's call.   Informed her she can take Ibuprofen 600-800 mg one hour before her appointment. She v/u and agreed.

## 2024-12-16 ENCOUNTER — OFFICE VISIT (OUTPATIENT)
Dept: OBSTETRICS AND GYNECOLOGY | Facility: CLINIC | Age: 29
End: 2024-12-16
Payer: COMMERCIAL

## 2024-12-16 VITALS
BODY MASS INDEX: 23.16 KG/M2 | WEIGHT: 139 LBS | DIASTOLIC BLOOD PRESSURE: 68 MMHG | HEIGHT: 65 IN | SYSTOLIC BLOOD PRESSURE: 104 MMHG

## 2024-12-16 DIAGNOSIS — Z30.432 ENCOUNTER FOR IUD REMOVAL: Primary | ICD-10-CM

## 2024-12-16 DIAGNOSIS — Z30.02 NATURAL FAMILY PLANNING: ICD-10-CM

## 2024-12-16 NOTE — PROGRESS NOTES
"     Gynecologic Procedure Note        Procedure: IUD Removal     Procedures    Pre procedure indication     1) Desires Removal of IUD    Post procedure indication   1) Desires Removal of IUD    /68 (BP Location: Right arm)   Ht 165.1 cm (65\")   Wt 63 kg (139 lb)   LMP 12/09/2024 (Approximate)   BMI 23.13 kg/m²       The patient presents today for removal of her IUD.  She requests removal of her IUD due to  dysmenorrhea and cotting. She plans to use natural family planning (NFP) for contraception. All her questions were answered and consents were signed.    Time out: immediate members of the procedure team and patient agree to the following: correct patient, correct site, correct procedure to be performed. Corina RENEE/Jaida DE JESUS      The patient was placed in a dorsal lithotomy position on the examining table in stirrups.  A speculum was inserted into the vagina and the cervix was brought into view.  An IUD string was visualized.  The string was then grasped and removed intact with gentle traction.  There was a Minimal amount of bleeding and cramping.    All  instruments were removed from the vagina.       The patient tolerated the procedure very well with a mild amount of discomfort.  She was monitored for 5 minutes prior to discharge.      There were no complications. Motrin 600mg po given to patient per her request.     The patient was counseled on other options for birth control. She plans to use natural family planning (NFP) for contraception.     Problem List Items Addressed This Visit    None  Visit Diagnoses       Encounter for IUD removal    -  Primary    Natural family planning              Follow up for annual     THELMA Daugherty  12/16/2024    "

## 2025-03-04 ENCOUNTER — OFFICE VISIT (OUTPATIENT)
Dept: OBSTETRICS AND GYNECOLOGY | Facility: CLINIC | Age: 30
End: 2025-03-04
Payer: COMMERCIAL

## 2025-03-04 VITALS
HEIGHT: 65 IN | WEIGHT: 134 LBS | DIASTOLIC BLOOD PRESSURE: 70 MMHG | BODY MASS INDEX: 22.33 KG/M2 | SYSTOLIC BLOOD PRESSURE: 100 MMHG

## 2025-03-04 DIAGNOSIS — Z01.419 ROUTINE GYNECOLOGICAL EXAMINATION: Primary | ICD-10-CM

## 2025-03-04 LAB
BILIRUB BLD-MCNC: NEGATIVE MG/DL
CLARITY, POC: CLEAR
COLOR UR: ABNORMAL
EXPIRATION DATE: ABNORMAL
GLUCOSE UR STRIP-MCNC: NEGATIVE MG/DL
KETONES UR QL: ABNORMAL
LEUKOCYTE EST, POC: NEGATIVE
Lab: ABNORMAL
NITRITE UR-MCNC: NEGATIVE MG/ML
PH UR: 6 [PH] (ref 5–8)
PROT UR STRIP-MCNC: NEGATIVE MG/DL
RBC # UR STRIP: NEGATIVE /UL
SP GR UR: 1.03 (ref 1–1.03)
UROBILINOGEN UR QL: NORMAL

## 2025-03-04 PROCEDURE — 99395 PREV VISIT EST AGE 18-39: CPT | Performed by: OBSTETRICS & GYNECOLOGY

## 2025-03-04 PROCEDURE — 81003 URINALYSIS AUTO W/O SCOPE: CPT | Performed by: OBSTETRICS & GYNECOLOGY

## 2025-03-04 NOTE — PROGRESS NOTES
Gynecologic Annual Exam Note        Annual Exam        Subjective     HPI  Brittanie Bentley is a 29 y.o.  female who presents for annual well woman exam as a established patient. There were no changes to her medical or surgical history since her last visit.. Patient's last menstrual period was 2025 (approximate).  Her periods are irregular  The flow is heavy. She reports dysmenorrhea is severe occurring premenstrually and first 1-2 days of flow. Marital Status: single.  She is sexually active. She has not had new partners.. STD testing recommendations have been explained to the patient and she does desire STD testing.    The patient would like to discuss the following complaints today: possible BV and UTI.   CCUA neg    Additional OB/GYN History   contraceptive methods: Condoms  Desires to: continue contraception  Thromboembolic Disease: none  History of migraines: yes with aura  Age of menarche: 15    History of STD: no    Last Pap : 2022. Results: ASCUS. HPV: negative.   Last Completed Pap Smear            Ordered - PAP SMEAR (Every 3 Years) Ordered on 3/4/2025      2022  LIQUID-BASED PAP SMEAR, [facility] ([facility])    2021  SCANNED - PAP SMEAR    2020  Done - in [place]                     History of abnormal Pap smear: no  Gardasil status:unsure if she received the vaccine  Family history of uterine, colon, breast, or ovarian cancer: yes - other with uterine cance  Performs monthly Self-Breast Exam: yes  Exercises Regularly:yes  Feelings of Anxiety or Depression: no  Tobacco Usage?: No       Current Outpatient Medications:     Methylphenidate HCl ER 18 MG CR tablet, Take 1 tablet by mouth 1 (One) Time As Needed., Disp: , Rfl:      Patient denies the need for medication refills today.    OB History          4    Para   2    Term   2       0    AB   2    Living   2         SAB   1    IAB   0    Ectopic   0    Molar   0    Multiple   0    Live  "Births   2                Health Maintenance   Topic Date Due    ANNUAL PHYSICAL  Never done    Annual Gynecologic Pelvic and Breast Exam  08/19/2022    INFLUENZA VACCINE  07/01/2024    COVID-19 Vaccine (3 - 2024-25 season) 09/01/2024    PAP SMEAR  09/22/2025    TDAP/TD VACCINES (4 - Td or Tdap) 05/20/2032    HEPATITIS C SCREENING  Completed    Pneumococcal Vaccine 0-49  Aged Out    CHLAMYDIA SCREENING  Discontinued       Past Medical History:   Diagnosis Date    Bipolar 2 disorder Spring 2020    History of abnormal cervical Papanicolaou smear     Migraine         Past Surgical History:   Procedure Laterality Date    DILATATION AND CURETTAGE  03/28/2024    INTRAUTERINE DEVICE INSERTION  05/03/2024    LAPAROSCOPIC CHOLECYSTECTOMY      age 16     ORIF ELBOW FRACTURE Left     age 8    PERCUTANEOUS PINNING ELBOW FRACTURE      WISDOM TOOTH EXTRACTION         The additional following portions of the patient's history were reviewed and updated as appropriate: allergies, current medications, past family history, past medical history, past social history, past surgical history, and problem list.    Review of Systems   All other systems reviewed and are negative.        I have reviewed and agree with the HPI, ROS, and historical information as entered above. Greta Mccann MD          Objective   /70   Ht 165.1 cm (65\")   Wt 60.8 kg (134 lb)   LMP 02/14/2025 (Approximate)   BMI 22.30 kg/m²     Physical Exam  Vitals and nursing note reviewed. Exam conducted with a chaperone present.   Constitutional:       Appearance: She is well-developed.   HENT:      Head: Normocephalic and atraumatic.   Eyes:      Pupils: Pupils are equal, round, and reactive to light.   Neck:      Thyroid: No thyroid mass or thyromegaly.   Pulmonary:      Effort: Pulmonary effort is normal. No retractions.   Chest:      Chest wall: No mass.   Breasts:     Right: Normal. No mass, nipple discharge, skin change or tenderness.      Left: Normal. No " mass, nipple discharge, skin change or tenderness.   Abdominal:      General: Bowel sounds are normal.      Palpations: Abdomen is soft. Abdomen is not rigid. There is no mass.      Tenderness: There is no abdominal tenderness. There is no guarding.      Hernia: No hernia is present. There is no hernia in the left inguinal area or right inguinal area.   Genitourinary:     Exam position: Lithotomy position.      Pubic Area: No rash.       Labia:         Right: No rash, tenderness or lesion.         Left: No rash, tenderness or lesion.       Urethra: No urethral pain or urethral swelling.      Vagina: Normal. No vaginal discharge or lesions.      Cervix: No cervical motion tenderness, discharge, lesion or cervical bleeding.      Uterus: Normal. Not enlarged, not fixed and not tender.       Adnexa:         Right: No mass, tenderness or fullness.          Left: No mass, tenderness or fullness.        Rectum: No external hemorrhoid.   Musculoskeletal:      Cervical back: Normal range of motion. No muscular tenderness.      Right lower leg: No edema.      Left lower leg: No edema.   Skin:     General: Skin is warm and dry.   Neurological:      Mental Status: She is alert and oriented to person, place, and time.      Motor: Motor function is intact.   Psychiatric:         Mood and Affect: Mood and affect normal.         Behavior: Behavior normal.            Assessment and Plan    Problem List Items Addressed This Visit    None  Visit Diagnoses       Routine gynecological examination    -  Primary    Relevant Orders    POC Urinalysis Dipstick, Automated (Completed)    LIQUID-BASED PAP SMEAR WITH HPV GENOTYPING REGARDLESS OF INTERPRETATION ([facility])            GYN annual well woman exam.   Nuswab done  Reviewed pap guidelines.   Reviewed monthly self breast exams.  Instructed to call with lumps, pain, or breast discharge.    Reviewed exercise as a preventative health measures.   Return in about 1 year (around 3/4/2026)  for Annual physical.    Greta Mccann MD  03/04/2025

## 2025-03-05 ENCOUNTER — TELEPHONE (OUTPATIENT)
Dept: OBSTETRICS AND GYNECOLOGY | Facility: CLINIC | Age: 30
End: 2025-03-05
Payer: COMMERCIAL

## 2025-03-05 NOTE — TELEPHONE ENCOUNTER
"Patient of Dr. Mccann; LOV was yesterday for annual exam.  Returned patient's call.   States she saw the elevated ketones in her POC urinalysis done here yesterday.  States she has felt \"off\" and has family history of medical issues.   Asking if she should be concerned or if follow up is needed.   Discussed that Dr. Mccann has not commented on that result; specific gravity was also higher so it could be that she needs to drink more water.  States she hydrates well and has had elevated ketones in the past, several years ago.   Advised her she can contact her PCP to discuss her concerns; they would be the best resource for that. She v/u and agreed.   "

## 2025-03-07 LAB — REF LAB TEST METHOD: NORMAL
